# Patient Record
Sex: MALE | Race: WHITE
[De-identification: names, ages, dates, MRNs, and addresses within clinical notes are randomized per-mention and may not be internally consistent; named-entity substitution may affect disease eponyms.]

---

## 2017-04-26 ENCOUNTER — HOSPITAL ENCOUNTER (OUTPATIENT)
Dept: HOSPITAL 43 - DL.SDS | Age: 82
Discharge: HOME | End: 2017-04-26
Attending: OPHTHALMOLOGY
Payer: MEDICARE

## 2017-04-26 VITALS — DIASTOLIC BLOOD PRESSURE: 75 MMHG | SYSTOLIC BLOOD PRESSURE: 131 MMHG

## 2017-04-26 DIAGNOSIS — Z88.8: ICD-10-CM

## 2017-04-26 DIAGNOSIS — Z98.890: ICD-10-CM

## 2017-04-26 DIAGNOSIS — H26.9: Primary | ICD-10-CM

## 2017-04-26 DIAGNOSIS — Z96.641: ICD-10-CM

## 2017-04-26 DIAGNOSIS — Z79.899: ICD-10-CM

## 2017-04-26 DIAGNOSIS — Z87.891: ICD-10-CM

## 2017-04-26 PROCEDURE — 66984 XCAPSL CTRC RMVL W/O ECP: CPT

## 2017-04-26 PROCEDURE — C1780 LENS, INTRAOCULAR (NEW TECH): HCPCS

## 2017-04-26 NOTE — OR
DATE:  04/26/2017

 

PREOPERATIVE DIAGNOSES:  Complex cataract, left eye.  Small pupil, Malyugin

ring.

 

PREOPERATIVE DIAGNOSES:  Complex cataract, left eye.  Small pupil, Malyugin

ring.

 

INDICATION:  Mr. Norman was seen in the clinic with complaints of blurred vision.

Clinical examination revealed visually significant cataract.  He has difficulty

reading and difficulty with bright lights and glare.  I explained options.  I

offered cataract surgery, and I explained risks preoperatively including the

potential for infection, retinal detachment, loss of vision, need for additional

surgery, and risks associated with anesthesia.  He is symptomatic and requested

surgery.  He requested a monofocal implant.

 

OPERATIVE DESCRIPTION:  After informed consent was obtained.  The risks,

benefits, and alternatives were explained, the patient was brought to the OR and

topical anesthesia was administered.  He was prepped and draped in a sterile

fashion.  Attention was placed on the left eye.  A sterile lid speculum was

placed into the left eye to allow operative exposure.  A full-thickness

paracentesis was then made temporally.  Preservative-free lidocaine followed by

viscoelastic was injected into the anterior chamber.  A 2.75 mm incision was

then made temporally.  Pupil was noted to be small, approximately 4 mm.

Malyugin ring was inserted to expand the pupil to approximately 6 mm.  Bent

needle cystotome was then used to create a small nick in the anterior capsule.

A 360 degree curvilinear capsulorrhexis was created.  Nucleus was

hydrodissected, hydrodelineated, decompressed centrally, rotated and noted to be

free of any adhesions.  The nucleus was then removed using a quick chop

technique at the iris plane.  Following the nucleus removal, the remaining

cortical material was removed using the I and A handpiece.  Additional

viscoelastic was injected into the capsular bag.  The intra-ocular lens was

inserted.  The Malyugin ring was then removed.  The remaining viscoelastic was

then aspirated from both the anterior and posterior chamber.  Wound and

paracentesis sites were hydrated using balanced saline solution.  A 0.1 mL of

preservative-free vancomycin was injected intracamerally.  Intra-ocular lens was

inspected and noted to be clear and well-centered.  Good red reflex was noted.

Intra-ocular pressure was assessed and found to be in the high normal range.

Wound and paracentesis sites were Magen negative.  Postoperative medications

were administered.  Sterile patch and shield was placed over the eye.  Patient

was awakened from light sedation and transported to the postoperative recovery

area having tolerated the procedure well.  No complications occurred.

 

DD:  04/26/2017 08:38:54

DT:  04/26/2017 10:28:41

[facility]

Job #:  [id number]

## 2017-05-01 ENCOUNTER — HOSPITAL ENCOUNTER (EMERGENCY)
Dept: HOSPITAL 43 - DL.ED | Age: 82
Discharge: HOME | End: 2017-05-01
Payer: MEDICARE

## 2017-05-01 VITALS — SYSTOLIC BLOOD PRESSURE: 129 MMHG | DIASTOLIC BLOOD PRESSURE: 88 MMHG

## 2017-05-01 DIAGNOSIS — E78.00: ICD-10-CM

## 2017-05-01 DIAGNOSIS — Z88.8: ICD-10-CM
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
## 2017-05-01 DIAGNOSIS — Z88.5: ICD-10-CM
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
## 2017-05-01 DIAGNOSIS — S70.01XA: Primary | ICD-10-CM

## 2017-05-01 DIAGNOSIS — F32.9: ICD-10-CM

## 2017-05-01 DIAGNOSIS — Z87.440: ICD-10-CM

## 2017-05-01 DIAGNOSIS — Z87.891: ICD-10-CM

## 2017-05-01 DIAGNOSIS — Z96.641: ICD-10-CM

## 2017-05-01 NOTE — CR
CLINICAL HISTORY: 81-year-old male with history prostate cancer (radiation seeds) and right hip pain
 after fall

 

INTERPRETATION: AP pelvis/frog lateral hips and AP/frog lateral right hip films document multilevel 
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
is; and total orthopedic prosthesis right hip. Femoral and acetabular components of the total prosth
esis satisfactorily "seated".

 

No sign of acute pelvic or either hip fracture/dislocation.

## 2017-05-01 NOTE — EDM.PDOC
ED HPI Trauma





- General


Chief Complaint: Lower Extremity Injury/Pain


Stated Complaint: [id number] FELL ON CEMENT BROKE HIP?


Time Seen by Provider: 17 09:38


Source: Reports: Patient, Old records, RN, RN notes reviewed


History Limitations: Reports: No limitations





- History of Present Illness


INITIAL COMMENTS - FREE TEXT/NARRATIVE: 


Arrives from home by POV with c/o right hip pain sustained last night from a 

ground level fall. Pt tripped and lost his balance. He denies lightheadedness 

or syncope. Denies any other injury. Pt ambulated into the hospital/ED dept. 

but reports hip pain is worse with wt bearing. Pain is partially relieved with 

rest. Rates pain 6/10. Pt took a hydrocodone this morning and got some relief. 

Denies head injury, or any other injury.





Symptom Onset Date: 17


Occurred When: yesterday


Occurred Where: home


Method of Injury: fall


Severity: moderate


Pain/Injury Location: Reports: lower extremity, right


Consciousness: Reports: no loss of consciousness, remembers incident


Associated Symptoms: Reports: no other symptoms


Allergies/ADRs: 


Allergies





lorazepam [From Ativan] Allergy (Verified 17 07:16)


 Confusion


morphine Allergy (Verified 17 07:16)


 Confusion


oxybutynin Allergy (Verified 17 07:16)


 Cannot Remember








Home Medications: 


Ambulatory Orders





Omeprazole [Prilosec] 20 mg PO DAILY 14 [Confirmed 17]


Simvastatin [Zocor] 40 mg PO BEDTIME 14 [Confirmed 17]


Tamsulosin [Flomax] 0.4 mg PO DAILY 14 [Confirmed 17]


Bicalutamide [Casodex] 1 tab PO DAILY 16 [Confirmed 17]


Docusate Sodium/Sennosides [Senna Plus] 2 tab PO BEDTIME #30 tablet 16 [

Confirmed 17]


Acetaminophen/HYDROcodone [Norco 325-10 MG] 1 tab PO Q6H PRN 17 [

Confirmed 17]


Escitalopram Oxalate [Lexapro] 1 tab PO DAILY 17 [Confirmed 17]


Pred-Moxi-Ketor [Cataract Opthalmic Solution] 1 drop EYELF QID 17 [

Confirmed 17]


Ciprofloxacin HCl [Cipro] 1 tab PO BID 17 [Confirmed 17]











Past Medical History


HEENT History: Reports: Cataract, Impaired vision, Other (see below)


Other HEENT History: wears glasses


Cardiovascular History: Reports: High cholesterol


Respiratory History: Reports: None


Gastrointestinal History: Reports: Chronic constipation, GERD


Genitourinary History: Reports: BPH, UTI, recurrent, Other (see below)


Other Genitourinary History: dilalation of urethra


Musculoskeletal History: Reports: Back pain, chronic, Fracture


Other Musculoskeletal History: left femur surgery


Neurological History: Reports: None


Psychiatric History: Reports: Depression


Endocrine/Metabolic History: Reports: None


Hematologic History: Reports: None


Immunologic History: Reports: None


Oncologic (Cancer) History: Reports: Prostate


Dermatologic History: Reports: None





- Infectious Disease History


Infectious Disease History: Reports: Chicken pox, Measles, Mumps, Other (see 

below)





- Past Surgical History


Head Surgeries/Procedures: Reports: None


HEENT Surgical History: Reports: None


Cardiovascular Surgical History: Reports: None


GI Surgical History: Reports: Colonoscopy


Other Male  Surgeries/Procedures: prostate problems


Musculoskeletal Surgical History: Reports: Other (see below)


Other Musculoskeletal Surgeries/Procedures:: back surg, R) hip joint replacement





Social & Family History





- Family History


Family Medical History: Noncontributory


Other Hematologic Family History: mother  of cerebral aneurysm





- Tobacco Use


Smoking Status *Q: Former Smoker


Years of Tobacco use: 20


Packs/Tins Daily: 0.5


Used Tobacco, but Quit: Yes


Month Tobacco Last Used: when he was 41 years old


Second Hand Smoke Exposure: No





- Caffeine Use


Caffeine Use: Reports: Coffee, Soda





- Alcohol Use


Days Per Week of Alcohol Use: 0





- Recreational Drug Use


Recreational Drug Use: No





- Living Situation & Occupation


Occupation: retired





Review of Systems





- Review of Systems


Review Of Systems: ROS reveals no pertinent complaints other than HPI.





Trauma Exam





- Physical Exam


Exam: See Below


Exam Limited By: No limitations


General Appearance: Reports: alert, WD/WN, no apparent distress


Head: Reports: atraumatic, normocephalic


Ears: Reports: normal external exam, hearing grossly normal


Nose: Reports: normal inspection


Throat/Mouth: Reports: Normal voice, No airway compromise


Neck: Reports: non-tender, full range of motion, normal alignment, normal 

inspection


Respiratory Exam: Reports: no respiratory distress, lungs clear, normal breath 

sounds, no accessory muscle use, chest non-tender


Cardiovascular: Reports: regular rate, rhythm


GI/Abdominal: Reports: normal bowel sounds, soft, non tender, no distention


 (Male) Exam: Deferred


Rectal (Males) Exam: Deferred


Back: Reports: decreased range of motion (chronic/stable per pt.).  Denies: CVA 

tenderness (R), CVA tenderness (L), paraspinal tenderness, vertebral tenderness


Extremities: Reports: pelvis stable, pain with movement, tenderness (to 

palpation at Rt lateral hip region, no visible bruising or swelling; skin is 

intact.), other (able to bear wt).  Denies: bony-point tenderness, pedal edema


Neurologic: Reports: CNs II-XII nml as tested, no motor/sensory deficits, alert

, normal mood/affect, oriented x 3


Skin: Reports: Normal color, Warm/dry





- [place] Coma Score


Best Eye Response ([place]): (4) open spontaneously


Best Verbal Response ([place]): (5) oriented


Best Motor Response ([place]): (6) obeys commands


[place] Total: 15





Course





- Vital Signs


Last Recorded V/S: 


 Last Vital Signs











Temp  36.1 C   17 09:46


 


Pulse  76   17 09:46


 


Resp  16   17 09:46


 


BP  129/88   17 09:46


 


Pulse Ox  96   17 09:46














- Orders/Labs/Meds


Orders: 


 Active Orders 24 hr











 Category Date Time Status


 


 Hip Min 2V or 3V w Pelvis Rt [CR] Stat Exams  17 09:48 Ordered














- Radiology Interpretation


Free Text/Narrative:: 


Xray Rt hip & pelvis: no acute fractures, prosthetic Rt hip; see Rad. report.





Departure





- Departure


Time of Disposition: 10:03


Disposition: Home, Self-Care 01


Condition: good


Clinical Impression: 


 Fall as cause of accidental injury at home as place of occurrence





Contusion of right hip


Qualifiers:


 Encounter type: initial encounter Qualified Code(s): S70.01XA - Contusion of 

right hip, initial encounter





Instructions:  Hip Pointer, Easy-to-Read, Hip Pain


Forms:  ED Department Discharge


Additional Instructions: 


Activity as tolerated.


Rest, and use ice packs to area of right hip pain.


Follow up in clinic with your doctor if not improving in 7 to 10 days.





- My Orders


Last 24 Hours: 


My Active Orders





17 09:48


Hip Min 2V or 3V w Pelvis Rt [CR] Stat 














- Assessment/Plan


Last 24 Hours: 


My Active Orders





17 09:48


Hip Min 2V or 3V w Pelvis Rt [CR] Stat

## 2017-05-17 ENCOUNTER — HOSPITAL ENCOUNTER (OUTPATIENT)
Dept: HOSPITAL 43 - DL.SDS | Age: 82
Discharge: HOME | End: 2017-05-17
Attending: OPHTHALMOLOGY
Payer: MEDICARE

## 2017-05-17 VITALS — DIASTOLIC BLOOD PRESSURE: 82 MMHG | SYSTOLIC BLOOD PRESSURE: 178 MMHG

## 2017-05-17 DIAGNOSIS — H26.9: Primary | ICD-10-CM

## 2017-05-17 DIAGNOSIS — Z98.890: ICD-10-CM

## 2017-05-17 DIAGNOSIS — Z88.8: ICD-10-CM

## 2017-05-17 DIAGNOSIS — Z87.891: ICD-10-CM

## 2017-05-17 DIAGNOSIS — Z96.641: ICD-10-CM

## 2017-05-17 PROCEDURE — C1780 LENS, INTRAOCULAR (NEW TECH): HCPCS

## 2017-05-17 PROCEDURE — 66984 XCAPSL CTRC RMVL W/O ECP: CPT

## 2017-05-17 PROCEDURE — 00142 ANES PX ON EYE LENS SURGERY: CPT

## 2017-05-17 NOTE — OR
DATE:  05/17/2017

 

PREOPERATIVE DIAGNOSIS:  Cataract, right eye.

 

POSTOPERATIVE DIAGNOSIS:  Cataract, right eye.

 

PROCEDURE:  Extracapsular cataract extraction with intraocular lens implant,

right eye.

 

ANESTHESIA:  Topical/local MAC.

 

COMPLICATIONS:  None.

 

INDICATION:  Mr. Norman was seen in the clinic.  He has complained of difficulty

reading, difficulty with fine with bright lights, difficulty with glare.

Clinical examination reveals visually significant mixed cataract.  I explained

options.  I offered cataract surgery and I explained risks including but not

limited to, infection, retinal detachment, loss of vision, need for additional

surgery, and risks associated with anesthesia.  We discussed implant options.

He requested a monofocal implant.

 

OPERATIVE DESCRIPTION:  After informed consent was obtained and the risks,

benefits, and alternatives were explained, the patient was brought to the

operative suite and topical anesthesia was administered.  The patient was then

prepped and draped in the sterile fashion and attention was placed on the right

eye.  A sterile lid speculum was placed into the right eye to allow operative

exposure. A full-thickness paracentesis was made in the temporal portion of the

operative eye. Preservative-free lidocaine 0.1 mL was injected into the anterior

chamber followed by viscoelastic. A full-thickness corneal incision was then

made into the anterior chamber.  A bent needle cystotome was used to create a

small nick in the anterior capsule. The capsulorrhexis forceps was then used to

create a 360-degree curvilinear capsulorrhexis.  The nucleus was then removed

using a phacoemulsification handpiece and the remaining cortical material was

then removed with irrigation and aspiration handpiece. Following removal of the

cortical material, the capsular bag was then inspected and noted to be free of

any holes or tears. Viscoelastic was then injected into the capsular bag and the

intraocular lens was inserted into the capsular bag.  No complications occurred.

The viscoelastic material was then removed from both the anterior and posterior

chambers and from behind the IOL. The lens and capsular bag were then

reinspected. The IOL was well centered and the capsular bag intact. The wound

and paracentesis sites were inspected and hydrated with balanced saline

solution. Both were found to be self-sealing. The intraocular pressure was

assessed digitally and found to be within normal range. A good red reflex was

noted at the completion of the procedure. No complications occurred during the

operation. At the completion of the procedure, Maxitrol, Voltaren, and Iopidine

drops were placed into the operative eye. A sterile eye shield was placed over

the operative eye and the patient was transported to the postoperative recovery

area having tolerated the procedure well. Postoperative instructions were given

along with a postoperative appointment.  The patient was advised to call with

any questions or concerns.

 

DD:  05/17/2017 10:07:25

DT:  05/17/2017 11:13:44

[facility]

Job #:  [id number]

## 2017-05-18 NOTE — OR
DATE:  05/17/2017

 

PREOPERATIVE DIAGNOSIS:  Visually significant cataract, right eye.

 

POSTOPERATIVE DIAGNOSIS:  Visually significant cataract, right eye.

 

PROCEDURE:  Complex cataract right eye with small pupil, Malyugin ring.

 

INDICATION:  Mr. Norman was seen in the clinic.  Clinical examination revealed

visually significant cataract.  I explained options.  I offered cataract

surgery, and I explained risks preoperatively including but not limited to,

infection, retinal detachment, loss of vision, need for additional surgery, and

risks associated with anesthesia.  We discussed implant options.  He requested a

monofocal implant.

 

OPERATIVE DESCRIPTION:  After informed consent was obtained.  The risks,

benefits, and alternatives were explained, the patient was brought to the OR and

topical anesthesia was administered.  He was prepped and draped in a sterile

fashion.  Attention was placed on the right eye.  A sterile lid speculum was

placed into the right eye to allow operative exposure.  A full-thickness

paracentesis was then made temporally.  Preservative-free lidocaine followed by

viscoelastic was injected into the anterior chamber.  A 2.75 mm corneal incision

was then made temporally.  The pupil was noted to be small, Malyugin ring was

inserted to expand the pupil to approximately 5.5 mm.  Bent needle cystotome was

then used to create a small nick in the anterior capsule.  A 5 mm curvilinear

capsulorrhexis was created.  Nucleus was then hydrodissected and

hydrodelineated.  Nucleus was decompressed centrally and rotated, noted to be

free of any adhesions.  Nucleus was then removed using the phacoemulsification

handpiece in a quick chop technique.  Remaining cortical material was removed

using the irrigation, aspiration handpiece.  Additional viscoelastic was

injected into the capsular bag.  The intraocular lens was inserted.  The

Malyugin ring was then removed.  The remaining viscoelastic was then aspirated

from both the anterior and posterior chamber.  Wound and paracentesis sites were

hydrated.  A 0.1 mL of preservative-free vancomycin was injected intracamerally.

Intra-ocular lens was inspected and noted to be clear and well-centered.  Good

red reflex was noted.  Wound and paracentesis sites were Magen negative.  Intra-

ocular pressure was assessed digitally and found to be in the high normal range.

Postoperative medications were administered.  Sterile patch and shield was

placed over the eye.  The patient was awakened from light sedation and

transported to the postoperative recovery area having tolerated the procedure

well.  No complications occurred.

 

DD:  05/17/2017 10:34:41

DT:  05/17/2017 15:19:47

[facility]

Job #:  [id number]

## 2017-07-25 ENCOUNTER — HOSPITAL ENCOUNTER (EMERGENCY)
Dept: HOSPITAL 43 - DL.ED | Age: 82
Discharge: HOME | End: 2017-07-25
Payer: MEDICARE

## 2017-07-25 VITALS — SYSTOLIC BLOOD PRESSURE: 119 MMHG | DIASTOLIC BLOOD PRESSURE: 72 MMHG

## 2017-07-25 DIAGNOSIS — K21.9: ICD-10-CM

## 2017-07-25 DIAGNOSIS — Z98.49: ICD-10-CM

## 2017-07-25 DIAGNOSIS — N30.00: Primary | ICD-10-CM

## 2017-07-25 DIAGNOSIS — H54.7: ICD-10-CM

## 2017-07-25 DIAGNOSIS — Z88.8: ICD-10-CM

## 2017-07-25 DIAGNOSIS — F32.9: ICD-10-CM

## 2017-07-25 DIAGNOSIS — Z87.440: ICD-10-CM

## 2017-07-25 DIAGNOSIS — Z88.5: ICD-10-CM

## 2017-07-25 DIAGNOSIS — E78.00: ICD-10-CM

## 2017-07-25 DIAGNOSIS — Z79.899: ICD-10-CM

## 2017-07-25 NOTE — EDM.PDOC
ED HPI GENERAL MEDICAL PROBLEM





- General


Chief Complaint: General


Stated Complaint: CAN'T PEE OR ANYTHING


Time Seen by Provider: 17 11:35


Source of Information: Reports: Patient


History Limitations: Reports: No Limitations





- History of Present Illness


INITIAL COMMENTS - FREE TEXT/NARRATIVE: 





This 80 yo male patient reports to the ED with continued and increased pelvic 

pain. The patient reports his pain got much worse today. The patient has been 

seen by several providers for similar symptoms. The patient is seeing Dr. Mandel, Gilda Toribio, NP and a urologist for these symptoms in the past. The 

patient reports he has not been able to urinate in the past hour. The patient 

reports some frustration with his continuing symptoms. The patient reports he 

started on Oxycontin last week and was started on Augmentin yesterday due to 

urinalysis results.


Onset: Today


Onset Date: 17


Onset Time: 06:00


Duration: Hour(s):, Constant


Location: Reports: Abdomen, Pelvis


Quality: Reports: Ache, Sharp


Severity: Severe


Improves with: Reports: None


Worsens with: Reports: None


Associated Symptoms: Reports: No Other Symptoms


  ** Left Pelvic


Pain Score (Numeric/FACES): 5





- Related Data


 Allergies











Allergy/AdvReac Type Severity Reaction Status Date / Time


 


lorazepam [From Ativan] Allergy  Confusion Verified 17 09:06


 


morphine Allergy  Confusion Verified 17 09:06


 


oxybutynin Allergy  Cannot Verified 17 09:06





   Remember  











Home Meds: 


 Home Meds





Omeprazole [Prilosec] 20 mg PO DAILY 14 [History]


Simvastatin [Zocor] 40 mg PO BEDTIME 14 [History]


Tamsulosin [Flomax] 0.4 mg PO BEDTIME 14 [History]


Bicalutamide [Casodex] 1 tab PO DAILY 16 [History]


Docusate Sodium/Sennosides [Senna Plus] 2 tab PO BEDTIME #30 tablet 16 [Rx

]


Escitalopram Oxalate [Lexapro] 1 tab PO DAILY 17 [History]


Pred-Moxi-Ketor [Cataract Opthalmic Solution] 1 drop EYELF QID 17 [History

]


Hydrocodone/Acetaminophen [Norco 5-325 Tablet] 1 each PO Q6HR PRN 17 [

History]


Sulfamethoxazole/Trimethoprim [Sulfamethoxazole-Tmp Ds Tablet] 1 tab PO BID  [History]











Past Medical History


HEENT History: Reports: Cataract, Impaired Vision, Other (See Below)


Other HEENT History: wears glasses


Cardiovascular History: Reports: High Cholesterol


Respiratory History: Reports: None


Gastrointestinal History: Reports: Chronic Constipation, GERD


Genitourinary History: Reports: BPH, UTI, Recurrent, Other (See Below)


Other Genitourinary History: dilalation of urethra


Musculoskeletal History: Reports: Back Pain, Chronic, Fracture


Other Musculoskeletal History: left femur surgery


Neurological History: Reports: None


Psychiatric History: Reports: Depression


Endocrine/Metabolic History: Reports: None


Hematologic History: Reports: None


Immunologic History: Reports: None


Oncologic (Cancer) History: Reports: Prostate


Dermatologic History: Reports: None





- Infectious Disease History


Infectious Disease History: Reports: Chicken Pox, Measles, Mumps, Other (See 

Below)





- Past Surgical History


Head Surgeries/Procedures: Reports: None


HEENT Surgical History: Reports: None, Cataract Surgery


Cardiovascular Surgical History: Reports: None


GI Surgical History: Reports: Colonoscopy


Other Male  Surgeries/Procedures: prostate problems


Musculoskeletal Surgical History: Reports: Other (See Below)


Other Musculoskeletal Surgeries/Procedures:: back surg, R) hip joint replacement





Social & Family History





- Family History


Family Medical History: Noncontributory


Other Hematologic Family History: mother  of cerebral aneurysm





- Tobacco Use


Smoking Status *Q: Never Smoker


Years of Tobacco use: 20


Packs/Tins Daily: 0.5


Used Tobacco, but Quit: Yes


Month Tobacco Last Used: when he was 41 years old


Second Hand Smoke Exposure: No





- Caffeine Use


Caffeine Use: Reports: Coffee, Tea





- Alcohol Use


Days Per Week of Alcohol Use: 0





- Recreational Drug Use


Recreational Drug Use: No





- Living Situation & Occupation


Occupation: Retired





ED ROS GENERAL





- Review of Systems


Review Of Systems: ROS reveals no pertinent complaints other than HPI.





ED EXAM, GENERAL





- Physical Exam


Exam: See Below


Exam Limited By: No Limitations


General Appearance: Alert, WD/WN, No Apparent Distress


Eye Exam: Bilateral Eye: EOMI, Normal Inspection, PERRL


Ears: Normal External Exam, Normal Canal, Hearing Grossly Normal, Normal TMs


Nose: Normal Inspection, Normal Mucosa, No Blood


Throat/Mouth: Normal Inspection, Normal Lips, Normal Teeth, Normal Gums, Normal 

Oropharynx, Normal Voice, No Airway Compromise


Head: Atraumatic, Normocephalic


Neck: Normal Inspection, Supple, Non-Tender, Full Range of Motion


Respiratory/Chest: No Respiratory Distress, Lungs Clear, Normal Breath Sounds, 

No Accessory Muscle Use, Chest Non-Tender


Cardiovascular: Normal Peripheral Pulses, Regular Rate, Rhythm, No Edema, No 

Gallop, No JVD, No Murmur, No Rub


GI/Abdominal: Normal Bowel Sounds, Tender (lower abdomen)


 (Male) Exam: Deferred


Rectal (Males) Exam: Deferred


Back Exam: Normal Inspection, Full Range of Motion, NT


Extremities: Other (the patient has diffuse pelvic pain)


Neurological: Alert, Oriented, CN II-XII Intact, Abnormal Gait


Psychiatric: Anxious, Depressed Mood


Skin Exam: Warm, Dry, Intact, Normal Color, No Rash


Lymphatic: No Adenopathy





Course





- Vital Signs


Last Recorded V/S: 


 Last Vital Signs











Temp  36.4 C   17 11:26


 


Pulse  88   17 13:02


 


Resp  18   17 13:02


 


BP  119/72   17 13:02


 


Pulse Ox  98   17 13:02














Departure





- Departure


Time of Disposition: 13:25


Disposition: Home, Self-Care 01


Condition: Fair


Clinical Impression: 


UTI (urinary tract infection)


Qualifiers:


 Urinary tract infection type: acute cystitis Hematuria presence: without 

hematuria Qualified Code(s): N30.00 - Acute cystitis without hematuria








- Discharge Information


Instructions:  Urinary Tract Infection, Adult


Forms:  ED Department Discharge


Care Plan Goals: 


The patient and family were advised of the ultrasound results during the visit. 

The patient was encouraged to continue to take his Amoxicillin as prescribed. 

The patient has a follow-up appointment with Gilda Toribio on Thursday for continued 

evaluation and management. If the patient has any additional symptoms or 

concerns, the patient should either visit his primary care facility or return 

to the emergency department.

## 2017-07-25 NOTE — US
Clinical history: 81-year-old male with history of known prostate cancer (radiation seeds); complain
ing of persistent lower abdominal and suprapubic pain; who "hasn't peed for one hour". Started antib
iotic therapy yesterday for a "urinary tract infection". Recent radionuclide bone scan and MRI of th
e pelvis reportedly "unremarkable". Urinary retention?

 

Interpretation:

 

Mild uniformly thickened bladder wall.

 

Symmetrically distended, small volume, urinary bladder without sign mucosal wall mass or dependent i
ntraluminal echogenic "shadowing" urinary bladder stone. Small 12 mm diameter diverticulum base of t
he urinary bladder laterally, on the right.

 

Normal ureteral "jets" identified bilaterally.

## 2017-07-31 ENCOUNTER — HOSPITAL ENCOUNTER (INPATIENT)
Dept: HOSPITAL 43 - DL.ED | Age: 82
LOS: 3 days | Discharge: NURSING, REHAB OR LONG-TERM CARE | DRG: 543 | End: 2017-08-03
Attending: INTERNAL MEDICINE | Admitting: INTERNAL MEDICINE
Payer: MEDICARE

## 2017-07-31 DIAGNOSIS — E78.00: ICD-10-CM

## 2017-07-31 DIAGNOSIS — N30.00: ICD-10-CM

## 2017-07-31 DIAGNOSIS — F32.9: ICD-10-CM

## 2017-07-31 DIAGNOSIS — Z88.8: ICD-10-CM

## 2017-07-31 DIAGNOSIS — M84.454A: Primary | ICD-10-CM

## 2017-07-31 DIAGNOSIS — F03.90: ICD-10-CM

## 2017-07-31 DIAGNOSIS — R10.2: ICD-10-CM

## 2017-07-31 DIAGNOSIS — Z87.891: ICD-10-CM

## 2017-07-31 DIAGNOSIS — F05: ICD-10-CM

## 2017-07-31 DIAGNOSIS — R33.9: ICD-10-CM

## 2017-07-31 DIAGNOSIS — C61: ICD-10-CM

## 2017-07-31 DIAGNOSIS — Z79.899: ICD-10-CM

## 2017-07-31 DIAGNOSIS — F41.8: ICD-10-CM

## 2017-07-31 LAB
CHLORIDE SERPL-SCNC: 97 MMOL/L (ref 101–111)
SODIUM SERPL-SCNC: 135 MMOL/L (ref 135–145)

## 2017-07-31 PROCEDURE — 36415 COLL VENOUS BLD VENIPUNCTURE: CPT

## 2017-07-31 PROCEDURE — 87086 URINE CULTURE/COLONY COUNT: CPT

## 2017-07-31 PROCEDURE — 83605 ASSAY OF LACTIC ACID: CPT

## 2017-07-31 PROCEDURE — S0166 INJ OLANZAPINE 2.5MG: HCPCS

## 2017-07-31 PROCEDURE — 80053 COMPREHEN METABOLIC PANEL: CPT

## 2017-07-31 PROCEDURE — 87040 BLOOD CULTURE FOR BACTERIA: CPT

## 2017-07-31 PROCEDURE — 83735 ASSAY OF MAGNESIUM: CPT

## 2017-07-31 PROCEDURE — 81001 URINALYSIS AUTO W/SCOPE: CPT

## 2017-07-31 PROCEDURE — 85025 COMPLETE CBC W/AUTO DIFF WBC: CPT

## 2017-07-31 RX ADMIN — Medication PRN ML: at 22:25

## 2017-07-31 RX ADMIN — Medication SCH EACH: at 22:30

## 2017-07-31 RX ADMIN — OXYCODONE HYDROCHLORIDE AND ACETAMINOPHEN PRN TAB: 5; 325 TABLET ORAL at 20:43

## 2017-07-31 RX ADMIN — ATROPA BELLADONNA AND OPIUM SCH: 16.2; 6 SUPPOSITORY RECTAL at 20:52

## 2017-07-31 RX ADMIN — Medication PRN ML: at 21:01

## 2017-07-31 RX ADMIN — CIPROFLOXACIN SCH MLS/HR: 2 INJECTION, SOLUTION INTRAVENOUS at 21:01

## 2017-07-31 RX ADMIN — OXYCODONE HYDROCHLORIDE AND ACETAMINOPHEN PRN TAB: 5; 325 TABLET ORAL at 16:38

## 2017-07-31 RX ADMIN — Medication SCH: at 20:53

## 2017-07-31 RX ADMIN — HEPARIN SODIUM SCH UNITS: 5000 INJECTION, SOLUTION INTRAVENOUS; SUBCUTANEOUS at 22:29

## 2017-07-31 RX ADMIN — OXYCODONE HYDROCHLORIDE SCH MG: 20 TABLET, FILM COATED, EXTENDED RELEASE ORAL at 20:43

## 2017-07-31 NOTE — EDM.PDOC
ED HPI GENERAL MEDICAL PROBLEM





- General


Chief Complaint: Abdominal Pain


Stated Complaint: CONFUSSED, DEHYDRATION, CAN HARDLY WALK


Time Seen by Provider: 17 10:45


Source of Information: Reports: Patient, Family


History Limitations: Reports: No Limitations





- History of Present Illness


INITIAL COMMENTS - FREE TEXT/NARRATIVE: 





This 82 yo male patient reports to the ED with continued pelvic pain. The 

patient reports he continues to have pain despite his recent changes in 

medications and treatments. The patient has been too weak to take care of 

himself. The patient reports he has pain in the posterior pelvic that wraps 

around to the front of his pelvis. The patient's family reports the patient has 

refused to take any of his medications other than his pain medications. The 

patient is currently being seen by Gilda Toribio ([facility] in [place]) , 

Dr. Hester ([facility] in [place]) and Dr. Crum ([facility] in [place]). 

The patient had a bone scan done 1 1/2 weeks ago which was read as normal by 

radiologist, but was read as abnormal by Dr. Crum. The patient has been seen 

numerous times in both the clinic and ED over the past 2 weeks. At this time, 

the patient's family reports that they do not think the patient can take care 

of himself due to both pain as well as with his progressive dementia. The 

family reports the patient is very confused (especially in the evenings). The 

patient's family reports that the patient was attempting to make phone calls 

with the TV remote and change the channel with the phone. The memory issues 

have not gotten any worse with the recent increase in pain medications. The 

patient and family are supposed to be looking at an assisted care center today, 

but the family does not think this will be enough care with his current 

condition.  


Onset: Gradual


Duration: Week(s):, Constant, Getting Worse


Location: Reports: Pelvis


Quality: Reports: Ache, Sharp


Severity: Severe


Improves with: Reports: Medication (pain medication)


Worsens with: Reports: Movement


Context: Reports: Other


Associated Symptoms: Reports: Confusion (especially in the evenings), Weakness


Treatments PTA: Reports: Other Medication(s)


  ** Rectal


Pain Score (Numeric/FACES): 5





- Related Data


 Allergies











Allergy/AdvReac Type Severity Reaction Status Date / Time


 


lorazepam [From Ativan] Allergy  Confusion Verified 17 09:06


 


morphine Allergy  Confusion Verified 17 09:06


 


oxybutynin Allergy  Cannot Verified 17 09:06





   Remember  











Home Meds: 


 Home Meds





Omeprazole [Prilosec] 20 mg PO DAILY 14 [History]


Tamsulosin [Flomax] 0.4 mg PO BEDTIME 14 [History]


Bicalutamide [Casodex] 1 tab PO DAILY 16 [History]


Docusate Sodium/Sennosides [Senna Plus] 2 tab PO BEDTIME #30 tablet 16 [Rx

]


Escitalopram Oxalate [Lexapro] 1 tab PO DAILY 17 [History]


Hydrocodone/Acetaminophen [Norco 5-325 Tablet] 1 each PO Q6HR PRN 17 [

History]


oxyCODONE HCl [Oxycontin] 1 tab PO BID 17 [History]











Past Medical History


HEENT History: Reports: Cataract, Impaired Vision, Other (See Below)


Other HEENT History: wears glasses


Cardiovascular History: Reports: High Cholesterol


Respiratory History: Reports: None


Gastrointestinal History: Reports: Chronic Constipation, GERD


Genitourinary History: Reports: BPH, UTI, Recurrent, Other (See Below)


Other Genitourinary History: dilalation of urethra


Musculoskeletal History: Reports: Back Pain, Chronic, Fracture


Other Musculoskeletal History: left femur surgery


Neurological History: Reports: None


Psychiatric History: Reports: Depression


Endocrine/Metabolic History: Reports: None


Hematologic History: Reports: None


Immunologic History: Reports: None


Oncologic (Cancer) History: Reports: Prostate


Dermatologic History: Reports: None





- Infectious Disease History


Infectious Disease History: Reports: Chicken Pox, Measles, Mumps, Other (See 

Below)





- Past Surgical History


Head Surgeries/Procedures: Reports: None


HEENT Surgical History: Reports: None, Cataract Surgery


Cardiovascular Surgical History: Reports: None


GI Surgical History: Reports: Colonoscopy


Other Male  Surgeries/Procedures: prostate problems


Musculoskeletal Surgical History: Reports: Other (See Below)


Other Musculoskeletal Surgeries/Procedures:: back surg, R) hip joint replacement





Social & Family History





- Family History


Family Medical History: Noncontributory


Other Hematologic Family History: mother  of cerebral aneurysm





- Tobacco Use


Smoking Status *Q: Never Smoker


Years of Tobacco use: 20


Packs/Tins Daily: 0.5


Used Tobacco, but Quit: Yes


Month Tobacco Last Used: when he was 41 years old


Second Hand Smoke Exposure: No





- Caffeine Use


Caffeine Use: Reports: Coffee, Tea





- Alcohol Use


Days Per Week of Alcohol Use: 0





- Recreational Drug Use


Recreational Drug Use: No





- Living Situation & Occupation


Occupation: Retired





ED ROS GENERAL





- Review of Systems


Review Of Systems: ROS reveals no pertinent complaints other than HPI.





ED EXAM, GENERAL





- Physical Exam


Exam: See Below


Exam Limited By: No Limitations


General Appearance: Alert, WD/WN, Moderate Distress, Thin


Eye Exam: Bilateral Eye: EOMI, Normal Inspection, PERRL


Ears: Normal External Exam, Normal Canal, Hearing Grossly Normal, Normal TMs


Nose: Normal Inspection, Normal Mucosa, No Blood


Throat/Mouth: Normal Inspection, Normal Lips, Normal Teeth, Normal Gums, Normal 

Oropharynx, Normal Voice, No Airway Compromise


Head: Atraumatic, Normocephalic


Respiratory/Chest: No Respiratory Distress, Lungs Clear, Normal Breath Sounds, 

No Accessory Muscle Use, Chest Non-Tender


Cardiovascular: Normal Peripheral Pulses, Regular Rate, Rhythm, No Edema, No 

Gallop, No JVD, No Murmur, No Rub


GI/Abdominal: Normal Bowel Sounds, Soft, Tender (lower abdomen and pelvis)


 (Male) Exam: Deferred


Rectal (Males) Exam: Deferred


Back Exam: Normal Inspection, Full Range of Motion, NT


Extremities: Normal Inspection, Normal Range of Motion, Non-Tender, Normal 

Capillary Refill, No Pedal Edema


Neurological: Alert, Oriented, CN II-XII Intact, Normal Cognition, Normal Gait, 

Normal Reflexes, No Motor/Sensory Deficits


Psychiatric: Normal Affect, Normal Mood


Skin Exam: Warm, Dry, Intact, Normal Color, No Rash


Lymphatic: No Adenopathy





Course





- Vital Signs


Last Recorded V/S: 


 Last Vital Signs











Temp  36.9 C   17 12:07


 


Pulse  84   17 12:07


 


Resp  18   17 12:07


 


BP  140/56 L  17 12:07


 


Pulse Ox  91 L  17 12:07














- Orders/Labs/Meds


Orders: 


 Active Orders 24 hr











 Category Date Time Status


 


 CULTURE URINE [RM] Stat Lab  17 12:08 Ordered











Labs: 


 Laboratory Tests











  17 Range/Units





  11:04 11:04 11:04 


 


WBC  11.2 H    (5.0-10.0)  10^3/uL


 


RBC  4.08 L    (4.6-6.2)  10^6/uL


 


Hgb  11.5 L    (14.0-18.0)  g/dL


 


Hct  35.3 L    (40.0-54.0)  %


 


MCV  86.5    ()  fL


 


MCH  28.2    (27.0-34.0)  pg


 


MCHC  32.6 L    (33.0-35.0)  g/dL


 


Plt Count  299    (150-450)  10^3/uL


 


Neut % (Auto)  82.8 H    (42.2-75.2)  %


 


Lymph % (Auto)  8.1 L    (20.5-50.1)  %


 


Mono % (Auto)  7.3    (2-8)  %


 


Eos % (Auto)  1.5    (1.0-3.0)  %


 


Baso % (Auto)  0.3    (0.0-1.0)  %


 


Sodium   135   (135-145)  mmol/L


 


Potassium   4.0   (3.6-5.0)  mmol/L


 


Chloride   97 L   (101-111)  mmol/L


 


Carbon Dioxide   25.0   (21.0-31.0)  mmol/L


 


Anion Gap   17.0   


 


BUN   15   (7-18)  mg/dL


 


Creatinine   0.9   (0.6-1.3)  mg/dL


 


Est Cr Clr Drug Dosing   65.46   mL/min


 


Estimated GFR (MDRD)   > 60   


 


BUN/Creatinine Ratio   16.66   


 


Glucose   152 H   ()  mg/dL


 


Lactic Acid    1.1  (0.5-2.2)  mmol/L


 


Calcium   8.9   (8.4-10.2)  mg/dl


 


Magnesium   1.8   (1.8-2.5)  mg/dL


 


Total Bilirubin   0.9   (0.2-1.0)  mg/dL


 


AST   20   (10-42)  IU/L


 


ALT   23   (10-60)  IU/L


 


Alkaline Phosphatase   72   ()  IU/L


 


Total Protein   7.1   (6.7-8.2)  g/dl


 


Albumin   2.6 L   (3.2-5.5)  g/dl


 


Globulin   4.5   


 


Albumin/Globulin Ratio   0.58   


 


Urine Color     (YELLOW)  


 


Urine Appearance     (CLEAR)  


 


Urine pH     (5.0-9.0)  


 


Ur Specific Gravity     (1.005-1.030)  


 


Urine Protein     (NEGATIVE)  


 


Urine Glucose (UA)     (NEGATIVE)  


 


Urine Ketones     (NEGATIVE)  


 


Urine Occult Blood     (NEGATIVE)  


 


Urine Nitrite     (NEGATIVE)  


 


Urine Bilirubin     (NEGATIVE)  


 


Urine Urobilinogen     (0.2-1.0)  mg/dL


 


Ur Leukocyte Esterase     (NEGATIVE)  


 


Urine RBC     /HPF


 


Urine WBC     (0-5/HPF)  /HPF


 


Ur Epithelial Cells     /HPF


 


Amorphous Sediment     (0/HPF)  /HPF


 


Urine Bacteria     (0-FEW/HPF)  /HPF


 


Urine Mucus     /LPF














  17 Range/Units





  11:07 


 


WBC   (5.0-10.0)  10^3/uL


 


RBC   (4.6-6.2)  10^6/uL


 


Hgb   (14.0-18.0)  g/dL


 


Hct   (40.0-54.0)  %


 


MCV   ()  fL


 


MCH   (27.0-34.0)  pg


 


MCHC   (33.0-35.0)  g/dL


 


Plt Count   (150-450)  10^3/uL


 


Neut % (Auto)   (42.2-75.2)  %


 


Lymph % (Auto)   (20.5-50.1)  %


 


Mono % (Auto)   (2-8)  %


 


Eos % (Auto)   (1.0-3.0)  %


 


Baso % (Auto)   (0.0-1.0)  %


 


Sodium   (135-145)  mmol/L


 


Potassium   (3.6-5.0)  mmol/L


 


Chloride   (101-111)  mmol/L


 


Carbon Dioxide   (21.0-31.0)  mmol/L


 


Anion Gap   


 


BUN   (7-18)  mg/dL


 


Creatinine   (0.6-1.3)  mg/dL


 


Est Cr Clr Drug Dosing   mL/min


 


Estimated GFR (MDRD)   


 


BUN/Creatinine Ratio   


 


Glucose   ()  mg/dL


 


Lactic Acid   (0.5-2.2)  mmol/L


 


Calcium   (8.4-10.2)  mg/dl


 


Magnesium   (1.8-2.5)  mg/dL


 


Total Bilirubin   (0.2-1.0)  mg/dL


 


AST   (10-42)  IU/L


 


ALT   (10-60)  IU/L


 


Alkaline Phosphatase   ()  IU/L


 


Total Protein   (6.7-8.2)  g/dl


 


Albumin   (3.2-5.5)  g/dl


 


Globulin   


 


Albumin/Globulin Ratio   


 


Urine Color  Dark yellow  (YELLOW)  


 


Urine Appearance  Cloudy  (CLEAR)  


 


Urine pH  7.0  (5.0-9.0)  


 


Ur Specific Gravity  1.020  (1.005-1.030)  


 


Urine Protein  100 H  (NEGATIVE)  


 


Urine Glucose (UA)  Negative  (NEGATIVE)  


 


Urine Ketones  15 H  (NEGATIVE)  


 


Urine Occult Blood  Large H  (NEGATIVE)  


 


Urine Nitrite  Negative  (NEGATIVE)  


 


Urine Bilirubin  Small H  (NEGATIVE)  


 


Urine Urobilinogen  1.0  (0.2-1.0)  mg/dL


 


Ur Leukocyte Esterase  Large H  (NEGATIVE)  


 


Urine RBC  5-10 H  /HPF


 


Urine WBC  >100 H  (0-5/HPF)  /HPF


 


Ur Epithelial Cells  Few  /HPF


 


Amorphous Sediment  See note  (0/HPF)  /HPF


 


Urine Bacteria  Rare  (0-FEW/HPF)  /HPF


 


Urine Mucus  Few H  /LPF











Meds: 


Medications














Discontinued Medications














Generic Name Dose Route Start Last Admin





  Trade Name Freq  PRN Reason Stop Dose Admin


 


Ciprofloxacin  500 mg  17 12:15  





  Ciprofloxacin Hcl  PO  17 12:16  





  ONETIME ONE   














Departure





- Departure


Time of Disposition: 12:22


Disposition: Admitted As Inpatient 67


Condition: Fair


Clinical Impression: 


 Pelvic pain in male





UTI (urinary tract infection)


Qualifiers:


 Urinary tract infection type: acute cystitis Hematuria presence: without 

hematuria Qualified Code(s): N30.00 - Acute cystitis without hematuria








- Discharge Information


Care Plan Goals: 


Discussed the examination, history and lab results with Dr. Rogers. Dr. Rogers 

accepted the patient for continued evaluation and management as an inpatient at 

[facility] in [place]. 





- My Orders


Last 24 Hours: 


My Active Orders





17 12:08


CULTURE URINE [RM] Stat 














- Assessment/Plan


Last 24 Hours: 


My Active Orders





17 12:08


CULTURE URINE [RM] Stat

## 2017-07-31 NOTE — PCM.HP
H&P History of Present Illness





- General


Date of Service: 17


Admit Problem/Dx: 


 Admission Diagnosis/Problem





Admission Diagnosis/Problem      UTI, Urinary tract infectious disease








Source of Information: Patient, Family, Provider





- History of Present Illness


Initial Comments - Free Text/Narative: 








The patient is a 81-year-old gentleman with a history of prostate cancer status 

post radiation, erectile dysfunction, urinary retention with episodic self-

catheterization. The patient has frequent recurrent urinary tract infection.


For the prostate cancer he is followed by Dr. HICKMAN in [place], rest of the 

urology appointments are  with Dr. Jensen in [place]. Last time he was 

seen at the end of May when he was treated for urinary tract infection with 

Cipro. 


The patient presented with pain in the anterior pelvis area. The pain is 

moderate to severe, worse with movements, present for at least 2 or 3 weeks 

prior to this presentation.


No associated fever. No apparent trauma.


Prior workup included an MRI of the pelvis () which showed changes in the 

pubic bone compatible with radiation injury. Prior pelvic ultrasound showed 

small volume urinary bladder without sign of mucosa wall mass.


He has been treated with amoxicillin for a urinary tract infection starting on 

.


He has been taking OxyContin and oxycodone without help.


He denies fever. No urinary burning.


Family has also noted increased confusion, disorientation. This is usually 

first by the evening.


The patient has been living independently but family felt that this is not 

adequate anymore and they were looking into moving into an assisted living 

facility.


Onset of Symptoms: Reports: Gradual (Over 2-3 weeks)


  ** Rectal


Pain Score (Numeric/FACES): 5





  ** Generalized


Pain Score (Numeric/FACES): 5





- Related Data


Allergies/Adverse Reactions: 


 Allergies











Allergy/AdvReac Type Severity Reaction Status Date / Time


 


lorazepam [From Ativan] Allergy  Confusion Verified 17 13:47


 


morphine Allergy  Confusion Verified 17 13:47


 


oxybutynin Allergy  Cannot Verified 17 13:47





   Remember  











Home Medications: 


 Home Meds





Omeprazole [Prilosec] 20 mg PO DAILY 14 [History]


Tamsulosin [Flomax] 0.4 mg PO BEDTIME 14 [History]


Bicalutamide [Casodex] 1 tab PO DAILY 16 [History]


Docusate Sodium/Sennosides [Senna Plus] 2 tab PO BEDTIME #30 tablet 16 [Rx

]


Escitalopram Oxalate [Lexapro] 1 tab PO DAILY 17 [History]


Hydrocodone/Acetaminophen [Norco 5-325 Tablet] 1 each PO Q6HR PRN 17 [

History]


oxyCODONE HCl [Oxycontin] 1 tab PO BID 17 [History]











Past Medical History


HEENT History: Reports: Cataract, Impaired Vision, Other (See Below)


Other HEENT History: wears glasses


Cardiovascular History: Reports: High Cholesterol


Respiratory History: Reports: None


Gastrointestinal History: Reports: Chronic Constipation, GERD


Genitourinary History: Reports: BPH, UTI, Recurrent, Other (See Below)


Other Genitourinary History: dilalation of urethra


Musculoskeletal History: Reports: Back Pain, Chronic, Fracture


Other Musculoskeletal History: left femur surgery


Neurological History: Reports: None


Psychiatric History: Reports: Depression


Endocrine/Metabolic History: Reports: None


Hematologic History: Reports: None


Immunologic History: Reports: None


Oncologic (Cancer) History: Reports: Prostate


Dermatologic History: Reports: None





- Infectious Disease History


Infectious Disease History: Reports: Chicken Pox, Measles, Mumps, Other (See 

Below)





- Past Surgical History


Head Surgeries/Procedures: Reports: None


HEENT Surgical History: Reports: None, Cataract Surgery


Cardiovascular Surgical History: Reports: None


GI Surgical History: Reports: Colonoscopy


Other Male  Surgeries/Procedures: prostate problems


Musculoskeletal Surgical History: Reports: Other (See Below)


Other Musculoskeletal Surgeries/Procedures:: back surg, R) hip joint replacement





Social & Family History





- Family History


Family Medical History: Noncontributory


Other Hematologic Family History: mother  of cerebral aneurysm





- Tobacco Use


Smoking Status *Q: Former Smoker


Years of Tobacco use: 20


Packs/Tins Daily: 0.5


Used Tobacco, but Quit: Yes


Month Tobacco Last Used: when he was 41 years old


Second Hand Smoke Exposure: No





- Caffeine Use


Caffeine Use: Reports: Coffee, Tea





- Alcohol Use


Days Per Week of Alcohol Use: 0





- Recreational Drug Use


Recreational Drug Use: No





- Living Situation & Occupation


Occupation: Retired





H&P Review of Systems





- Review of Systems:


Review Of Systems: See Below


General: Denies: Fever, Chills


Pulmonary: Denies: Shortness of Breath


Cardiovascular: Denies: Chest Pain


Gastrointestinal: Reports: Anorexia, Decreased Appetite, Other (Anterior pelvic 

pain).  Denies: Black Stool, Difficulty Swallowing, Vomiting


Genitourinary: Denies: Dysuria, Frequency, Burning


Psychiatric: Reports: Confusion (in the evening)





Exam





- Exam


Exam: See Below





- Vital Signs


Vital Signs: 


 Last Vital Signs











Temp  36.9 C   17 13:32


 


Pulse  81   17 13:32


 


Resp  20   17 13:32


 


BP  155/68 H  17 13:32


 


Pulse Ox  95   17 13:32











Weight: 76.385 kg





- Exam


General: Alert, Oriented, Other (Poor historian)


Neck: Supple


Lungs: Clear to Auscultation, Normal Respiratory Effort


Cardiovascular: Regular Rate, Regular Rhythm


GI/Abdominal Exam: Normal Bowel Sounds, Soft, Tender, Other (Anterior pelvic 

tenderness).  No: No Distention, No Abnormal Bruit


 (Male) Exam: No: Testicular Mass


Extremities: No Pedal Edema


Skin: Warm, Dry


Neurological: Cranial Nerves Intact


Neuro Extensive - Mental Status: Alert, Oriented x3, Normal Mood/Affect


Psychiatric: Anxious





- Patient Data


Result Diagrams: 


 17 11:04





 17 11:04





*Q Meaningful Use (ADM)





- VTE *Q


VTE Criteria *Q: 








- Stroke *Q


Stroke Criteria *Q: 








- AMI *Q


AMI Criteria *Q: 








- Problem List


(1) Pelvic pain in male


SNOMED Code(s): 52971306


   ICD Code: R10.2 - PELVIC AND PERINEAL PAIN   Status: Acute   Current Visit: 

Yes   


Problem List Initiated/Reviewed/Updated: Yes


Orders Last 24hrs: 


 Active Orders 24 hr











 Category Date Time Status


 


 Patient Status [ADT] Routine ADT  17 14:23 Active


 


 Antiembolic Devices [RC] PER UNIT ROUTINE Care  17 14:25 Active


 


 Oxygen Therapy [RC] PRN Care  17 14:23 Active


 


 Peripheral IV Care [RC] .AS DIRECTED Care  17 14:25 Active


 


 VTE/DVT Education [RC] PER UNIT ROUTINE Care  17 14:23 Active


 


 Vital Signs [RC] Q4H Care  17 14:23 Active


 


 Regular Diet [DIET] Diet  17 Dinner Active


 


 CTA Abd Pelv w Cont [CT] Routine Exams  17 14:13 Ordered


 


 CULTURE BLOOD [BC] Stat Lab  17 14:37 Ordered


 


 CULTURE BLOOD [BC] Stat Lab  17 14:37 Ordered


 


 Acetaminophen [Tylenol] Med  17 14:23 Active





 650 mg PO Q4H PRN   


 


 Acetaminophen/oxyCODONE [Percocet 325-5 MG] Med  17 14:11 Active





 1 tab PO Q4H PRN   


 


 Belladonna/Opium [B & O Supprettes No. 16A] Med  17 21:00 Active





 1 supp RECTAL BID   


 


 Bicalutamide [Casodex] Med  17 09:00 Ordered





 1 tab PO DAILY   


 


 Ciprofloxacin in D5W [Cipro in D5W 400 MG/200 ML] 400 Med  17 21:00 

Active





 mg   





 Premix Bag 1 bag   





 IV Q12HR   


 


 Docusate Sodium/Sennosides [Senna Plus] Med  17 21:00 Active





 2 tab PO BEDTIME   


 


 Escitalopram [Lexapro] Med  17 09:00 Active





 20 mg PO DAILY   


 


 Heparin Sodium Med  17 22:00 Active





 5,000 units SUBCUT Q8HR   


 


 Omeprazole Med  17 06:00 Active





 20 mg PO ACBRK   


 


 Phenazopyridine [Urinary Pain Relief] Med  17 21:00 Active





 95 mg PO TID   


 


 Sodium Chloride 0.9% [Saline Flush] Med  17 14:23 Active





 10 ml FLUSH ASDIRECTED PRN   


 


 Tamsulosin [Flomax] Med  17 21:00 Active





 0.4 mg PO BEDTIME   


 


 Zolpidem [Ambien] Med  17 14:23 Active





 5 mg PO BEDTIME PRN   


 


 fentaNYL [Sublimaze] Med  17 14:26 Active





 25 mcg IVPUSH Q2H PRN   


 


 oxyCODONE ER [OxyCONTIN] Med  17 21:00 Active





 20 mg PO BID   


 


 Antiembolic Hose [OM.PC] Per Unit Routine Oth  17 14:24 Ordered


 


 Blood Culture x2 Reflex Set [OM.PC] Stat Oth  17 14:37 Ordered


 


 Peripheral IV Insertion Adult [OM.PC] Routine Oth  17 14:23 Ordered


 


 Saline Lock Insert [OM.PC] Routine Oth  17 14:23 Ordered


 


 Resuscitation Status Routine Resus Stat  17 14:23 Ordered








 Medication Orders





Acetaminophen (Tylenol)  650 mg PO Q4H PRN


   PRN Reason: Pain (Mild 1-3)/fever


Belladonna Alkaloids/Opium (B & O Supprettes No. 16a)  1 supp RECTAL BID [facility]


Escitalopram Oxalate (Lexapro)  20 mg PO DAILY [facility]


Fentanyl (Sublimaze)  25 mcg IVPUSH Q2H PRN


   PRN Reason: severe pain


Heparin Sodium (Porcine) (Heparin Sodium)  5,000 units SUBCUT Q8HR [facility]


Ciprofloxacin/Dextrose 400 mg/ (Premix)  200 mls @ 200 mls/hr IV Q12HR [facility]


Non-Formulary Medication (Bicalutamide [Casodex])  1 tab PO DAILY [facility]


Omeprazole (Omeprazole)  20 mg PO ACBRK [facility]


Oxycodone HCl (Oxycontin)  20 mg PO BID [facility]


Oxycodone/Acetaminophen (Percocet 325-5 Mg)  1 tab PO Q4H PRN


   PRN Reason: pelvic pain


Phenazopyridine HCl (Urinary Pain Relief)  95 mg PO TID [facility]


Senna/Docusate Sodium (Senna Plus)  2 tab PO BEDTIME [facility]


Sodium Chloride (Saline Flush)  10 ml FLUSH ASDIRECTED PRN


   PRN Reason: Keep Vein Open


Tamsulosin HCl (Flomax)  0.4 mg PO BEDTIME [facility]


Zolpidem Tartrate (Ambien)  5 mg PO BEDTIME PRN


   PRN Reason: Sleep








Assessment/Plan Comment:: 








Pelvic pain


The differential diagnosis is wide


This might represent constipation, cystitis, radiculopathy, radiation changes 

in the bone, pelvic fracture.


Recurrent malignancy.


We'll obtain CT of the abdomen and pelvis for further evaluation





Start the patient empirically for treatment for urinary tract infection, give B 

and O suppository and pyridium for possible spasms.


use oxycontin BID and oxycodone prn





possible UTI vs colonizoation vs. radiation cystitis


evaluate for uti wit blood and urine cx.


start on cipro





delirium with sundowning


might be due to dementia


narcotic use can exacerbate symptoms


will follow





dvt prophylaxis with sq heparin

## 2017-08-01 RX ADMIN — OXYCODONE HYDROCHLORIDE SCH MG: 20 TABLET, FILM COATED, EXTENDED RELEASE ORAL at 08:43

## 2017-08-01 RX ADMIN — OXYCODONE HYDROCHLORIDE AND ACETAMINOPHEN PRN TAB: 5; 325 TABLET ORAL at 11:29

## 2017-08-01 RX ADMIN — HEPARIN SODIUM SCH UNITS: 5000 INJECTION, SOLUTION INTRAVENOUS; SUBCUTANEOUS at 21:14

## 2017-08-01 RX ADMIN — OXYCODONE HYDROCHLORIDE SCH MG: 20 TABLET, FILM COATED, EXTENDED RELEASE ORAL at 20:31

## 2017-08-01 RX ADMIN — Medication PRN ML: at 09:45

## 2017-08-01 RX ADMIN — HEPARIN SODIUM SCH UNITS: 5000 INJECTION, SOLUTION INTRAVENOUS; SUBCUTANEOUS at 06:20

## 2017-08-01 RX ADMIN — CIPROFLOXACIN SCH MLS/HR: 2 INJECTION, SOLUTION INTRAVENOUS at 09:45

## 2017-08-01 RX ADMIN — CIPROFLOXACIN SCH MLS/HR: 2 INJECTION, SOLUTION INTRAVENOUS at 20:40

## 2017-08-01 RX ADMIN — HEPARIN SODIUM SCH UNITS: 5000 INJECTION, SOLUTION INTRAVENOUS; SUBCUTANEOUS at 13:12

## 2017-08-01 RX ADMIN — Medication PRN ML: at 10:51

## 2017-08-01 RX ADMIN — OXYCODONE HYDROCHLORIDE AND ACETAMINOPHEN PRN TAB: 5; 325 TABLET ORAL at 06:18

## 2017-08-01 RX ADMIN — Medication SCH: at 08:44

## 2017-08-01 RX ADMIN — Medication SCH EACH: at 20:33

## 2017-08-01 RX ADMIN — ATROPA BELLADONNA AND OPIUM SCH: 16.2; 6 SUPPOSITORY RECTAL at 08:44

## 2017-08-01 RX ADMIN — OMEPRAZOLE SCH MG: 20 CAPSULE, DELAYED RELEASE ORAL at 06:18

## 2017-08-01 RX ADMIN — OXYCODONE HYDROCHLORIDE AND ACETAMINOPHEN PRN TAB: 5; 325 TABLET ORAL at 22:58

## 2017-08-01 RX ADMIN — OXYCODONE HYDROCHLORIDE AND ACETAMINOPHEN PRN TAB: 5; 325 TABLET ORAL at 15:34

## 2017-08-01 RX ADMIN — Medication PRN ML: at 20:36

## 2017-08-01 RX ADMIN — OXYCODONE HYDROCHLORIDE AND ACETAMINOPHEN PRN TAB: 5; 325 TABLET ORAL at 02:00

## 2017-08-01 NOTE — PCM.PN
- General Info


Date of Service: 08/01/17


Admission Dx/Problem (Free Text): 


 Admission Diagnosis/Problem





Admission Diagnosis/Problem      UTI, Urinary tract infectious disease








Subjective Update: 





no fever


continued to have suprapubic pain


worse with movements, better with rest


has been present for weeks


no apparent trauma but has a h/o prostate ca





Functional Status: Denies: Pain Controlled





- Review of Systems


General: Denies: Fever


Pulmonary: Denies: Shortness of Breath


Cardiovascular: Denies: Chest Pain


Gastrointestinal: Denies: Abdominal Pain


Genitourinary: Denies: Dysuria





- Patient Data


Vitals - Most Recent: 


 Last Vital Signs











Temp  36.4 C   08/01/17 11:00


 


Pulse  78   08/01/17 11:00


 


Resp  20   08/01/17 11:00


 


BP  143/68 H  08/01/17 11:00


 


Pulse Ox  92 L  08/01/17 11:00











Weight - Most Recent: 76.385 kg


I&O - Last 24 Hours: 


 Intake & Output











 07/31/17 08/01/17 08/01/17





 22:59 06:59 14:59


 


Intake Total 200 550 190


 


Output Total 202 775 120


 


Balance -2 -225 70











Med Orders - Current: 


 Current Medications





Acetaminophen (Tylenol)  650 mg PO Q4H PRN


   PRN Reason: Pain (Mild 1-3)/fever


Escitalopram Oxalate (Lexapro)  20 mg PO DAILY [facility]


   Last Admin: 08/01/17 08:43 Dose:  20 mg


Fentanyl (Sublimaze)  25 mcg IVPUSH Q2H PRN


   PRN Reason: severe pain


Heparin Sodium (Porcine) (Heparin Sodium)  5,000 units SUBCUT Q8HR [facility]


   Last Admin: 08/01/17 06:20 Dose:  5,000 units


Ciprofloxacin/Dextrose 400 mg/ (Premix)  200 mls @ 200 mls/hr IV Q12HR [facility]


   Last Admin: 08/01/17 09:45 Dose:  200 mls/hr


Omeprazole (Omeprazole)  20 mg PO ACBRK [facility]


   Last Admin: 08/01/17 06:18 Dose:  20 mg


Oxycodone HCl (Oxycontin)  20 mg PO BID [facility]


   Last Admin: 08/01/17 08:43 Dose:  20 mg


Oxycodone/Acetaminophen (Percocet 325-5 Mg)  1 tab PO Q4H PRN


   PRN Reason: pelvic pain


   Last Admin: 08/01/17 11:29 Dose:  1 tab


Bicalutamide 50 Mg * (*Own Med**)  1 each PO BEDTIME [facility]


   Last Admin: 07/31/17 22:30 Dose:  1 each


Senna/Docusate Sodium (Senna Plus)  2 tab PO BEDTIME [facility]


   Last Admin: 07/31/17 20:42 Dose:  2 tab


Sodium Chloride (Saline Flush)  10 ml FLUSH ASDIRECTED PRN


   PRN Reason: Keep Vein Open


   Last Admin: 08/01/17 10:51 Dose:  10 ml


Tamsulosin HCl (Flomax)  0.4 mg PO BEDTIME [facility]


   Last Admin: 07/31/17 20:43 Dose:  0.4 mg


Zolpidem Tartrate (Ambien)  5 mg PO BEDTIME PRN


   PRN Reason: Sleep





Discontinued Medications





Barium Sulfate (Readi-Cat 2)  900 ml PO ONETIME ONE


   Stop: 07/31/17 14:34


   Last Admin: 07/31/17 14:47 Dose:  900 ml


Belladonna Alkaloids/Opium (B & O Supprettes No. 16a)  1 supp RECTAL BID [facility]


   Last Admin: 08/01/17 08:44 Dose:  Not Given


Ciprofloxacin (Ciprofloxacin Hcl)  500 mg PO ONETIME ONE


   Stop: 07/31/17 12:16


   Last Admin: 07/31/17 12:32 Dose:  500 mg


Iopamidol (Isovue-300 (61%))  75 ml IVPUSH ONETIME ONE


   Stop: 07/31/17 14:35


   Last Admin: 07/31/17 16:19 Dose:  75 ml


Non-Formulary Medication (Bicalutamide [Casodex])  1 tab PO DAILY [facility]


Phenazopyridine HCl (Urinary Pain Relief)  95 mg PO TID [facility]


   Last Admin: 08/01/17 08:44 Dose:  Not Given











- Exam


General: Alert, Oriented


Neck: Supple


Lungs: Clear to Auscultation


Cardiovascular: Regular Rate, Regular Rhythm


Extremities: No Pedal Edema





- Problem List & Annotations


(1) Pelvic pain in male


SNOMED Code(s): 83778589


   Code(s): R10.2 - PELVIC AND PERINEAL PAIN   Status: Acute   Current Visit: 

Yes   





(2) Pelvic fracture


SNOMED Code(s): 91564249


   Code(s): S32.9XXA - FRACTURE OF UNSP PARTS OF LUMBOSACRAL SPINE AND PELVIS, 

INIT   Status: Acute   Current Visit: Yes   





- Problem List Review


Problem List Initiated/Reviewed/Updated: Yes





- My Orders


Last 24 Hours: 


My Active Orders





07/31/17 11:04


CULTURE BLOOD [BC] Stat 





07/31/17 14:26


fentaNYL [Sublimaze]   25 mcg IVPUSH Q2H PRN 





07/31/17 14:37


Blood Culture x2 Reflex Set [OM.PC] Stat 





07/31/17 15:00


CULTURE BLOOD [BC] Stat 





07/31/17 21:30


Patient's Own Medication [Ptom]   1 each PO BEDTIME 





08/01/17 11:45


Lidocaine 5% [Lidoderm 5%]   700 mg TOP DAILY 





08/02/17 05:15


BASIC METABOLIC PANEL,BMP [CHEM] AM 


CBC WITH AUTO DIFF [HEME] AM 














- Plan


Plan:: 








Pelvic pain


CT showed pubic bone fracture


possible pathologic with prostate cancer


use oxycontin BID and oxycodone prn


start pt/ot


start lidoderm patch





possible UTI vs colonization vs. radiation cystitis


pending blood and urine cx.


started on cipro





delirium with sundowning


might be due to dementia


narcotic use can exacerbate symptoms


will follow





dvt prophylaxis with sq heparin
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
## 2017-08-02 LAB
CHLORIDE SERPL-SCNC: 98 MMOL/L (ref 101–111)
SODIUM SERPL-SCNC: 138 MMOL/L (ref 135–145)

## 2017-08-02 RX ADMIN — OMEPRAZOLE SCH MG: 20 CAPSULE, DELAYED RELEASE ORAL at 07:19

## 2017-08-02 RX ADMIN — Medication PRN ML: at 19:51

## 2017-08-02 RX ADMIN — CIPROFLOXACIN SCH MLS/HR: 2 INJECTION, SOLUTION INTRAVENOUS at 10:03

## 2017-08-02 RX ADMIN — CIPROFLOXACIN SCH MLS/HR: 2 INJECTION, SOLUTION INTRAVENOUS at 21:01

## 2017-08-02 RX ADMIN — HEPARIN SODIUM SCH UNITS: 5000 INJECTION, SOLUTION INTRAVENOUS; SUBCUTANEOUS at 13:19

## 2017-08-02 RX ADMIN — OXYCODONE HYDROCHLORIDE SCH MG: 20 TABLET, FILM COATED, EXTENDED RELEASE ORAL at 20:58

## 2017-08-02 RX ADMIN — HEPARIN SODIUM SCH UNITS: 5000 INJECTION, SOLUTION INTRAVENOUS; SUBCUTANEOUS at 07:18

## 2017-08-02 RX ADMIN — OXYCODONE HYDROCHLORIDE SCH MG: 20 TABLET, FILM COATED, EXTENDED RELEASE ORAL at 10:04

## 2017-08-02 RX ADMIN — OXYCODONE HYDROCHLORIDE AND ACETAMINOPHEN PRN TAB: 5; 325 TABLET ORAL at 13:18

## 2017-08-02 RX ADMIN — HEPARIN SODIUM SCH UNITS: 5000 INJECTION, SOLUTION INTRAVENOUS; SUBCUTANEOUS at 21:00

## 2017-08-02 RX ADMIN — OXYCODONE HYDROCHLORIDE AND ACETAMINOPHEN PRN TAB: 5; 325 TABLET ORAL at 19:15

## 2017-08-02 RX ADMIN — Medication SCH EACH: at 21:05

## 2017-08-02 NOTE — PCM.PN
- General Info


Date of Service: 08/02/17


Admission Dx/Problem (Free Text): 


 Admission Diagnosis/Problem





Admission Diagnosis/Problem      UTI, Urinary tract infectious disease








Subjective Update: 





no fever


continued to have suprapubic pain


worse with movements, better with rest


has been present for weeks


no apparent trauma but has a h/o prostate ca





last evening had increased confusion, delirium, agitation


improved after valium, zyprexa








- Review of Systems


General: Denies: Fever


Pulmonary: Denies: Shortness of Breath


Cardiovascular: Denies: Chest Pain


Gastrointestinal: Denies: Abdominal Pain





- Patient Data


Vitals - Most Recent: 


 Last Vital Signs











Temp  36.2 C   08/02/17 11:00


 


Pulse  76   08/02/17 11:00


 


Resp  20   08/02/17 11:00


 


BP  118/61   08/02/17 11:00


 


Pulse Ox  92 L  08/02/17 11:00











Weight - Most Recent: 76.385 kg


I&O - Last 24 Hours: 


 Intake & Output











 08/01/17 08/02/17 08/02/17





 22:59 06:59 14:59


 


Intake Total 649  190


 


Output Total 240  


 


Balance 409  190











Lab Results Last 24 Hours: 


 Laboratory Results - last 24 hr











  08/02/17 08/02/17 Range/Units





  08:37 08:37 


 


WBC  9.1   (5.0-10.0)  10^3/uL


 


RBC  4.36 L   (4.6-6.2)  10^6/uL


 


Hgb  12.3 L   (14.0-18.0)  g/dL


 


Hct  38.5 L   (40.0-54.0)  %


 


MCV  88.3   ()  fL


 


MCH  28.2   (27.0-34.0)  pg


 


MCHC  31.9 L   (33.0-35.0)  g/dL


 


Plt Count  292   (150-450)  10^3/uL


 


Neut % (Auto)  76.1 H   (42.2-75.2)  %


 


Lymph % (Auto)  12.2 L   (20.5-50.1)  %


 


Mono % (Auto)  7.4   (2-8)  %


 


Eos % (Auto)  4.0 H   (1.0-3.0)  %


 


Baso % (Auto)  0.3   (0.0-1.0)  %


 


Sodium   138  (135-145)  mmol/L


 


Potassium   4.2  (3.6-5.0)  mmol/L


 


Chloride   98 L  (101-111)  mmol/L


 


Carbon Dioxide   29.0  (21.0-31.0)  mmol/L


 


Anion Gap   15.2  


 


BUN   11  (7-18)  mg/dL


 


Creatinine   1.0  (0.6-1.3)  mg/dL


 


Est Cr Clr Drug Dosing   57.00  mL/min


 


Estimated GFR (MDRD)   > 60  


 


Glucose   193 H  ()  mg/dL


 


Calcium   9.3  (8.4-10.2)  mg/dl











Ezekiel Results Last 24 Hours: 


 Microbiology











 07/31/17 15:00 Aerobic Blood Culture - Preliminary





 Blood - Venous - Lab Draw    NO GROWTH AFTER 1 DAY





 Anaerobic Blood Culture - Preliminary





    NO GROWTH AFTER 1 DAY











Med Orders - Current: 


 Current Medications





Acetaminophen (Tylenol)  650 mg PO Q4H PRN


   PRN Reason: Pain (Mild 1-3)/fever


Diazepam (Valium.)  5 mg PO Q6H PRN


   PRN Reason: Agitation


   Last Admin: 08/01/17 15:37 Dose:  5 mg


Escitalopram Oxalate (Lexapro)  20 mg PO DAILY [facility]


   Last Admin: 08/02/17 10:04 Dose:  20 mg


Fentanyl (Sublimaze)  25 mcg IVPUSH Q2H PRN


   PRN Reason: severe pain


Heparin Sodium (Porcine) (Heparin Sodium)  5,000 units SUBCUT Q8HR [facility]


   Last Admin: 08/02/17 07:18 Dose:  5,000 units


Ciprofloxacin/Dextrose 400 mg/ (Premix)  200 mls @ 200 mls/hr IV Q12HR [facility]


   Last Admin: 08/02/17 10:03 Dose:  200 mls/hr


Lidocaine (Lidoderm 5%)  700 mg TOP DAILY [facility]


   Last Admin: 08/02/17 10:03 Dose:  700 mg


Miscellaneous Information (Remove Patch)  1 ea TRDERM BEDTIME [facility]


   Last Admin: 08/01/17 20:48 Dose:  Not Given


Olanzapine (Zyprexa)  5 mg IM Q6H PRN


   PRN Reason: agitation, anxiety


   Last Admin: 08/01/17 22:51 Dose:  5 mg


Omeprazole (Omeprazole)  20 mg PO ACBRK [facility]


   Last Admin: 08/02/17 07:19 Dose:  20 mg


Oxycodone HCl (Oxycontin)  20 mg PO BID [facility]


   Last Admin: 08/02/17 10:04 Dose:  20 mg


Oxycodone/Acetaminophen (Percocet 325-5 Mg)  1 tab PO Q4H PRN


   PRN Reason: pelvic pain


   Last Admin: 08/01/17 22:58 Dose:  1 tab


Bicalutamide 50 Mg * (*Own Med**)  1 each PO BEDTIME [facility]


   Last Admin: 08/01/17 20:33 Dose:  1 each


Senna/Docusate Sodium (Senna Plus)  2 tab PO BEDTIME [facility]


   Last Admin: 08/01/17 20:31 Dose:  2 tab


Sodium Chloride (Saline Flush)  10 ml FLUSH ASDIRECTED PRN


   PRN Reason: Keep Vein Open


   Last Admin: 08/01/17 20:36 Dose:  10 ml


Tamsulosin HCl (Flomax)  0.4 mg PO BEDTIME [facility]


   Last Admin: 08/01/17 20:31 Dose:  0.4 mg


Zolpidem Tartrate (Ambien)  5 mg PO BEDTIME PRN


   PRN Reason: Sleep


   Last Admin: 08/01/17 21:14 Dose:  5 mg





Discontinued Medications





Barium Sulfate (Readi-Cat 2)  900 ml PO ONETIME ONE


   Stop: 07/31/17 14:34


   Last Admin: 07/31/17 14:47 Dose:  900 ml


Belladonna Alkaloids/Opium (B & O Supprettes No. 16a)  1 supp RECTAL BID [facility]


   Last Admin: 08/01/17 08:44 Dose:  Not Given


Ciprofloxacin (Ciprofloxacin Hcl)  500 mg PO ONETIME ONE


   Stop: 07/31/17 12:16


   Last Admin: 07/31/17 12:32 Dose:  500 mg


Diazepam (Valium.)  5 mg PO ONETIME ONE


   Stop: 08/01/17 20:26


   Last Admin: 08/01/17 20:31 Dose:  5 mg


Iopamidol (Isovue-300 (61%))  75 ml IVPUSH ONETIME ONE


   Stop: 07/31/17 14:35


   Last Admin: 07/31/17 16:19 Dose:  75 ml


Non-Formulary Medication (Bicalutamide [Casodex])  1 tab PO DAILY [facility]


Phenazopyridine HCl (Urinary Pain Relief)  95 mg PO TID [facility]


   Last Admin: 08/01/17 08:44 Dose:  Not Given











- Exam


General: Alert, Oriented, Other (poor insight and poor short term memory, needs 

repeated reorientation)


Neck: Supple


Lungs: Clear to Auscultation, Normal Respiratory Effort


Cardiovascular: Regular Rate, Regular Rhythm


Extremities: Normal Inspection, No Pedal Edema


Skin: Warm, Dry


Psy/Mental Status: Alert





- Problem List & Annotations


(1) Pelvic pain in male


SNOMED Code(s): 44827289


   Code(s): R10.2 - PELVIC AND PERINEAL PAIN   Status: Acute   Current Visit: 

Yes   





(2) Pelvic fracture


SNOMED Code(s): 80613551


   Code(s): S32.9XXA - FRACTURE OF UNSP PARTS OF LUMBOSACRAL SPINE AND PELVIS, 

INIT   Status: Acute   Current Visit: Yes   





(3) Dementia


SNOMED Code(s): 99815966


   Code(s): F03.90 - UNSPECIFIED DEMENTIA WITHOUT BEHAVIORAL DISTURBANCE   

Status: Acute   Current Visit: Yes   





- Problem List Review


Problem List Initiated/Reviewed/Updated: Yes





- My Orders


Last 24 Hours: 


My Active Orders





08/01/17 11:45


Lidocaine 5% [Lidoderm 5%]   700 mg TOP DAILY 





08/01/17 15:18


Diazepam [Valium]   5 mg PO Q6H PRN 





08/01/17 21:00


Remove Patch   1 ea TRDERM BEDTIME 





08/01/17 22:36


OLANZapine [ZyPREXA]   5 mg IM Q6H PRN 














- Plan


Plan:: 








Pelvic pain


CT showed pubic bone fracture


possible pathologic with prostate cancer


use oxycontin BID and oxycodone prn


cont pt/ot


cont lidoderm patch





possible UTI vs colonization vs. radiation cystitis


pending blood cx


urine cx.: contaminant


started on cipro





delirium with sundowning


likely underlying dementia with h/o anxiety/depression


narcotic use can exacerbate symptoms


cont lexapro


prn zyprexa if needed at night


will follow





dvt prophylaxis with sq heparin

## 2017-08-03 VITALS — DIASTOLIC BLOOD PRESSURE: 66 MMHG | SYSTOLIC BLOOD PRESSURE: 140 MMHG

## 2017-08-03 RX ADMIN — Medication PRN ML: at 09:10

## 2017-08-03 RX ADMIN — OXYCODONE HYDROCHLORIDE SCH MG: 20 TABLET, FILM COATED, EXTENDED RELEASE ORAL at 09:07

## 2017-08-03 RX ADMIN — CIPROFLOXACIN SCH: 2 INJECTION, SOLUTION INTRAVENOUS at 09:50

## 2017-08-03 RX ADMIN — OXYCODONE HYDROCHLORIDE AND ACETAMINOPHEN PRN TAB: 5; 325 TABLET ORAL at 08:08

## 2017-08-03 RX ADMIN — HEPARIN SODIUM SCH: 5000 INJECTION, SOLUTION INTRAVENOUS; SUBCUTANEOUS at 08:08

## 2017-08-03 RX ADMIN — OMEPRAZOLE SCH MG: 20 CAPSULE, DELAYED RELEASE ORAL at 08:08

## 2017-08-03 NOTE — [email or web address]
**Discharge Summary





- Hospital Course


Free Text/Narrative:: 








presented with anterior pelvic  pain





Pelvic pain


CT showed pubic bone fracture


possible pathologic with prostate cancer


use oxycontin BID and oxycodone prn


cont lidoderm patch





possible UTI vs colonization vs. radiation cystitis


blood cx: neg


urine cx.: contaminant


started on cipro - will fnish a few days tx.





delirium with sundowning


likely underlying dementia with h/o anxiety/depression


narcotic use can exacerbate symptoms


cont lexapro


prn zyprexa if needed at night - I think it will be useful for a few days while 

adjusting to new environment

















- Discharge Data


Discharge Date: 08/03/17


Discharge Disposition: DC/[facility]


Condition: Stable





- Discharge Diagnosis/Problem(s)


(1) Pelvic pain in male


SNOMED Code(s): 97778953


   ICD Code: R10.2 - PELVIC AND PERINEAL PAIN   Status: Acute   Current Visit: 

Yes   





(2) Pelvic fracture


SNOMED Code(s): 91757610


   ICD Code: S32.9XXA - FRACTURE OF UNSP PARTS OF LUMBOSACRAL SPINE AND PELVIS, 

INIT   Status: Acute   Current Visit: Yes   





(3) Dementia


SNOMED Code(s): 64304855


   ICD Code: F03.90 - UNSPECIFIED DEMENTIA WITHOUT BEHAVIORAL DISTURBANCE   

Status: Acute   Current Visit: Yes   





- Patient Instructions


Diet: Usual Diet as Tolerated


Activity: As Tolerated





- Discharge Plan


Prescriptions/Med Rec: 


Ciprofloxacin HCl [Cipro] 250 mg PO BID #6 tablet


Lidocaine 5% [Lidoderm 5%] 700 mg TOP DAILY #6 patch


OLANZapine [ZyPREXA] 5 mg PO BEDTIME PRN #5 tablet


 PRN Reason: for sleep


Zolpidem [Ambien] 5 mg PO BEDTIME PRN #30 tablet


 PRN Reason: for sleep


Home Medications: 


 Home Meds





Omeprazole [Prilosec] 20 mg PO DAILY 03/21/14 [History]


Tamsulosin [Flomax] 0.4 mg PO BEDTIME 03/21/14 [History]


Bicalutamide [Casodex] 1 tab PO DAILY 02/02/16 [History]


Docusate Sodium/Sennosides [Senna Plus] 2 tab PO BEDTIME #30 tablet 02/03/16 [Rx

]


Escitalopram Oxalate [Lexapro] 1 tab PO DAILY 04/25/17 [History]


Hydrocodone/Acetaminophen [Norco 5-325] 1 each PO Q6HR PRN 05/16/17 [History]


oxyCODONE HCl [Oxycontin] 1 tab PO BID 07/31/17 [History]


Ciprofloxacin HCl [Cipro] 250 mg PO BID #6 tablet 08/03/17 [Rx]


Lidocaine 5% [Lidoderm 5%] 700 mg TOP DAILY #6 patch 08/03/17 [Rx]


OLANZapine [ZyPREXA] 5 mg PO BEDTIME PRN #5 tablet 08/03/17 [Rx]


Zolpidem [Ambien] 5 mg PO BEDTIME PRN #30 tablet 08/03/17 [Rx]











- Discharge Summary/Plan Comment


DC Time >30 min.: Yes (filling out NH transfer, referral documents)





- General Info


Date of Service: 08/03/17


Admission Dx/Problem (Free Text: 


 Admission Diagnosis/Problem





Admission Diagnosis/Problem      UTI, Urinary tract infectious disease








Subjective Update: 





no fever


continued to have suprapubic pain that is worse with movements, better with rest


has been present for weeks, improved since admission 


no apparent trauma but has a h/o prostate ca





slept well after valium, zyprexa








- Review of Systems


General: Denies: Fever


Pulmonary: Denies: Shortness of Breath


Cardiovascular: Denies: Chest Pain


Gastrointestinal: Denies: Abdominal Pain


Genitourinary: Denies: Dysuria





- Patient Data


Vitals - Most Recent: 


 Last Vital Signs











Temp  37.0 C   08/03/17 08:13


 


Pulse  78   08/03/17 08:13


 


Resp  20   08/03/17 08:13


 


BP  140/66   08/03/17 08:13


 


Pulse Ox  94 L  08/03/17 08:13











Weight - Most Recent: 76.385 kg


I&O - Last 24 hours: 


 Intake & Output











 08/02/17 08/03/17 08/03/17





 22:59 06:59 14:59


 


Intake Total  100 


 


Output Total 100 300 


 


Balance -100 -200 











VALERIE Results - Last 24 hrs: 


 Microbiology











 07/31/17 15:00 Aerobic Blood Culture - Preliminary





 Blood - Venous - Lab Draw    NO GROWTH AFTER 2 DAYS





 Anaerobic Blood Culture - Preliminary





    NO GROWTH AFTER 2 DAYS











Med Orders - Current: 


 Current Medications





Acetaminophen (Tylenol)  650 mg PO Q4H PRN


   PRN Reason: Pain (Mild 1-3)/fever


   Last Admin: 08/02/17 16:36 Dose:  650 mg


Diazepam (Valium.)  5 mg PO Q6H PRN


   PRN Reason: Agitation


   Last Admin: 08/02/17 19:56 Dose:  5 mg


Escitalopram Oxalate (Lexapro)  20 mg PO DAILY [facility]


   Last Admin: 08/03/17 09:07 Dose:  20 mg


Fentanyl (Sublimaze)  25 mcg IVPUSH Q2H PRN


   PRN Reason: severe pain


Heparin Sodium (Porcine) (Heparin Sodium)  5,000 units SUBCUT Q8HR [facility]


   Last Admin: 08/03/17 08:08 Dose:  Not Given


Ciprofloxacin/Dextrose 400 mg/ (Premix)  200 mls @ 200 mls/hr IV Q12HR [facility]


   Last Admin: 08/02/17 21:01 Dose:  200 mls/hr


Lidocaine (Lidoderm 5%)  700 mg TOP DAILY [facility]


   Last Admin: 08/03/17 09:08 Dose:  700 mg


Miscellaneous Information (Remove Patch)  1 ea TRDERM BEDTIME [facility]


   Last Admin: 08/02/17 21:01 Dose:  Not Given


Olanzapine (Zyprexa)  5 mg IM Q6H PRN


   PRN Reason: agitation, anxiety


   Last Admin: 08/02/17 20:58 Dose:  5 mg


Omeprazole (Omeprazole)  20 mg PO ACBRK [facility]


   Last Admin: 08/03/17 08:08 Dose:  20 mg


Oxycodone HCl (Oxycontin)  20 mg PO BID [facility]


   Last Admin: 08/03/17 09:07 Dose:  20 mg


Oxycodone/Acetaminophen (Percocet 325-5 Mg)  1 tab PO Q4H PRN


   PRN Reason: pelvic pain


   Last Admin: 08/03/17 08:08 Dose:  1 tab


Bicalutamide 50 Mg * (*Own Med**)  1 each PO BEDTIME [facility]


   Last Admin: 08/02/17 21:05 Dose:  1 each


Senna/Docusate Sodium (Senna Plus)  2 tab PO BEDTIME [facility]


   Last Admin: 08/02/17 20:57 Dose:  2 tab


Sodium Chloride (Saline Flush)  10 ml FLUSH ASDIRECTED PRN


   PRN Reason: Keep Vein Open


   Last Admin: 08/03/17 09:10 Dose:  10 ml


Tamsulosin HCl (Flomax)  0.4 mg PO BEDTIME [facility]


   Last Admin: 08/02/17 20:57 Dose:  0.4 mg


Zolpidem Tartrate (Ambien)  5 mg PO BEDTIME PRN


   PRN Reason: Sleep


   Last Admin: 08/02/17 20:57 Dose:  5 mg





Discontinued Medications





Barium Sulfate (Readi-Cat 2)  900 ml PO ONETIME ONE


   Stop: 07/31/17 14:34


   Last Admin: 07/31/17 14:47 Dose:  900 ml


Belladonna Alkaloids/Opium (B & O Supprettes No. 16a)  1 supp RECTAL BID [facility]


   Last Admin: 08/01/17 08:44 Dose:  Not Given


Ciprofloxacin (Ciprofloxacin Hcl)  500 mg PO ONETIME ONE


   Stop: 07/31/17 12:16


   Last Admin: 07/31/17 12:32 Dose:  500 mg


Diazepam (Valium.)  5 mg PO ONETIME ONE


   Stop: 08/01/17 20:26


   Last Admin: 08/01/17 20:31 Dose:  5 mg


Iopamidol (Isovue-300 (61%))  75 ml IVPUSH ONETIME ONE


   Stop: 07/31/17 14:35


   Last Admin: 07/31/17 16:19 Dose:  75 ml


Non-Formulary Medication (Bicalutamide [Casodex])  1 tab PO DAILY [facility]


Phenazopyridine HCl (Urinary Pain Relief)  95 mg PO TID [facility]


   Last Admin: 08/01/17 08:44 Dose:  Not Given











- Exam


General: Reports: Alert, Oriented


Neck: Reports: Supple


Lungs: Reports: Clear to Auscultation, Normal Respiratory Effort


GI/Abdominal Exam: Normal Bowel Sounds, Soft, Non-Tender


Extremities: No Pedal Edema


Skin: Reports: Warm, Dry


Neurological: Reports: No New Focal Deficit


Psy/Mental Status: Reports: Alert, Normal Affect, Normal Mood





*Q Meaningful Use (DIS)





- VTE *Q


VTE Criteria *Q: 








- Stroke *Q


Stroke Criteria *Q: 








- AMI *Q


AMI Criteria *Q:

## 2017-08-15 ENCOUNTER — HOSPITAL ENCOUNTER (EMERGENCY)
Dept: HOSPITAL 43 - DL.ED | Age: 82
Discharge: NURSING, REHAB OR LONG-TERM CARE | End: 2017-08-15
Payer: MEDICARE

## 2017-08-15 VITALS — DIASTOLIC BLOOD PRESSURE: 55 MMHG | SYSTOLIC BLOOD PRESSURE: 107 MMHG

## 2017-08-15 DIAGNOSIS — Z88.8: ICD-10-CM

## 2017-08-15 DIAGNOSIS — Z79.899: ICD-10-CM

## 2017-08-15 DIAGNOSIS — Z98.890: ICD-10-CM

## 2017-08-15 DIAGNOSIS — F32.9: ICD-10-CM

## 2017-08-15 DIAGNOSIS — F03.91: ICD-10-CM

## 2017-08-15 DIAGNOSIS — N39.0: ICD-10-CM

## 2017-08-15 DIAGNOSIS — Z88.5: ICD-10-CM

## 2017-08-15 DIAGNOSIS — Z87.440: ICD-10-CM

## 2017-08-15 DIAGNOSIS — K21.9: ICD-10-CM

## 2017-08-15 DIAGNOSIS — Z96.641: ICD-10-CM

## 2017-08-15 DIAGNOSIS — E78.00: ICD-10-CM

## 2017-08-15 DIAGNOSIS — Z87.891: ICD-10-CM

## 2017-08-15 DIAGNOSIS — H54.7: ICD-10-CM

## 2017-08-15 DIAGNOSIS — G89.4: Primary | ICD-10-CM

## 2017-08-15 DIAGNOSIS — Z98.49: ICD-10-CM

## 2017-08-15 LAB
CHLORIDE SERPL-SCNC: 98 MMOL/L (ref 101–111)
SODIUM SERPL-SCNC: 134 MMOL/L (ref 135–145)

## 2017-08-15 NOTE — EDM.PDOC
ED HPI GENERAL MEDICAL PROBLEM





- General


Chief Complaint: Lower Extremity Injury/Pain


Stated Complaint: PAIN


Time Seen by Provider: 08/15/17 19:15


Source of Information: Reports: Patient, EMS, Family, Nursing Home Records


History Limitations: Reports: Altered Mental Status (patient confused,)





- History of Present Illness


INITIAL COMMENTS - FREE TEXT/NARRATIVE: 





Patient transferred to ED per family request due to uncontrolled pain. Patient 

denies pain on arrival. Hx dementia and chronic pain from prostate CA and 

pelvic fracture. Family report strong dissatisfaction with NH and not being 

able to get help in timely manner and having to wait long periods for pain 

medications. Patient was ordered to have Fentynal patch placed today but refused

, no order discontinued. Last pain pil was 6 pm. Last prn hydrocodone at 1245. 

Family noted that patient has been failing in NH setting with skin breakdown 

and progressively getting weaker instead of better.  Briefs usually saturated. 

noted some clots today. Appointment in am with neuro psych and 2pm in [place] with 

oncologist. TC with NH staff and Dr. Durán , patient was out of nH wandering 

this afternoon. Patient refused fentanyl patch be initiated todayas "people die 

from that". 


Duration: Chronic


  ** Right Hip


Pain Score (Numeric/FACES): 5





- Related Data


 Allergies











Allergy/AdvReac Type Severity Reaction Status Date / Time


 


lorazepam [From Ativan] Allergy  Confusion Verified 08/15/17 18:51


 


morphine Allergy  Confusion Verified 08/15/17 18:51


 


nitrofurantoin Allergy  Other Verified 08/15/17 18:51





[From Macrobid]     


 


oxybutynin Allergy  Cannot Verified 08/15/17 18:51





   Remember  


 


phenazopyridine Allergy  Other Verified 08/15/17 18:51





[From Pyridium]     











Home Meds: 


 Home Meds





Omeprazole [Prilosec] 20 mg PO DAILY 14 [History]


Tamsulosin [Flomax] 0.4 mg PO BEDTIME 14 [History]


Bicalutamide [Casodex] 1 tab PO DAILY 16 [History]


Docusate Sodium/Sennosides [Senna Plus] 2 tab PO BEDTIME #30 tablet 16 [Rx

]


Escitalopram Oxalate [Lexapro] 1 tab PO DAILY 17 [History]


Hydrocodone/Acetaminophen [Norco 5-325] 1 each PO Q6HR PRN 17 [History]


oxyCODONE HCl [Oxycontin] 1 tab PO TID 17 [History]


Lidocaine 5% [Lidoderm 5%] 700 mg TOP DAILY #6 patch 17 [Rx]


Zolpidem [Ambien] 5 mg PO BEDTIME PRN #30 tablet 17 [Rx]


Magnesium Hydroxide [Milk of Magnesia] 30 ml PO DAILY PRN 08/15/17 [History]


OLANZapine [ZyPREXA] 5 mg PO BEDTIME PRN 08/15/17 [History]


fentaNYL [Fentanyl] 25 mcg TD Q72H 08/15/17 [History]











Past Medical History


HEENT History: Reports: Cataract, Impaired Vision, Other (See Below)


Other HEENT History: wears glasses


Cardiovascular History: Reports: High Cholesterol


Respiratory History: Reports: None


Gastrointestinal History: Reports: Chronic Constipation, GERD


Genitourinary History: Reports: BPH, UTI, Recurrent, Other (See Below)


Other Genitourinary History: dilalation of urethra


Musculoskeletal History: Reports: Back Pain, Chronic, Fracture


Other Musculoskeletal History: left femur surgery


Neurological History: Reports: None


Psychiatric History: Reports: Depression


Endocrine/Metabolic History: Reports: None


Hematologic History: Reports: None


Immunologic History: Reports: None


Oncologic (Cancer) History: Reports: Prostate


Dermatologic History: Reports: None





- Infectious Disease History


Infectious Disease History: Reports: Chicken Pox, Measles, Mumps, Other (See 

Below)





- Past Surgical History


Head Surgeries/Procedures: Reports: None


HEENT Surgical History: Reports: None, Cataract Surgery


Cardiovascular Surgical History: Reports: None


GI Surgical History: Reports: Colonoscopy


Other Male  Surgeries/Procedures: prostate problems


Musculoskeletal Surgical History: Reports: Other (See Below)


Other Musculoskeletal Surgeries/Procedures:: back surg, R) hip joint replacement





Social & Family History





- Family History


Family Medical History: Noncontributory


Other Hematologic Family History: mother  of cerebral aneurysm





- Tobacco Use


Smoking Status *Q: Former Smoker


Years of Tobacco use: 20


Packs/Tins Daily: 0.5


Used Tobacco, but Quit: No


Month Tobacco Last Used: when he was 41 years old


Second Hand Smoke Exposure: No





- Caffeine Use


Caffeine Use: Reports: Coffee, Soda, Tea





- Alcohol Use


Days Per Week of Alcohol Use: 0





- Recreational Drug Use


Recreational Drug Use: No





- Living Situation & Occupation


Occupation: Retired





Review of Systems





- Review of Systems


Review Of Systems: ROS reveals no pertinent complaints other than HPI.


Eyes: Reports: No Symptoms


Ears: Reports: No Symptoms


Nose: Reports: No Symptoms


Mouth/Throat: Reports: No Symptoms


Respiratory: Reports: No Symptoms


Cardiovascular: Reports: No Symptoms


GI/Abdominal: Reports: No Symptoms


Genitourinary: Reports: Hematuria, Incontinence


Musculoskeletal: Reports: Other (pelvic, right hip pain)


Skin: Reports: Other (healing pressure area to coccygeal fold, dried 

excoriation  left inner buttock. )


Neurological: Reports: Confusion


Psychiatric: Reports: Anxiety





ED EXAM, GENERAL





- Physical Exam


Exam: See Below


Exam Limited By: No Limitations


General Appearance: Alert, No Apparent Distress, Anxious


Eye Exam: Bilateral Eye: EOMI, PERRL


Ears: Normal External Exam


Nose: Normal Inspection


Throat/Mouth: Normal Inspection


Head: Atraumatic, Normocephalic


Neck: Normal Inspection, Supple, Full Range of Motion


Respiratory/Chest: No Respiratory Distress, Lungs Clear, Normal Breath Sounds


Cardiovascular: Normal Peripheral Pulses, Regular Rate, Rhythm, No Edema


GI/Abdominal: Normal Bowel Sounds, Soft, Non-Tender


Back Exam: Normal Inspection.  No: CVA Tenderness (L), CVA Tenderness (R)


Extremities: Normal Inspection, Pedal Edema (trace)


Neurological: Alert, Confused, Disoriented.  No: Oriented (person only)


Psychiatric: Anxious


Skin Exam: Warm, Dry, Intact, Normal Color





Course





- Vital Signs


Last Recorded V/S: 


 Last Vital Signs











Temp  98.2 F   08/15/17 20:26


 


Pulse  79   08/15/17 20:26


 


Resp  18   08/15/17 20:26


 


BP  107/55 L  08/15/17 20:26


 


Pulse Ox  90 L  08/15/17 20:26














- Orders/Labs/Meds


Orders: 


 Active Orders 24 hr











 Category Date Time Status


 


 CULTURE BLOOD [BC] Stat Lab  08/15/17 19:35 Received


 


 CULTURE BLOOD [BC] Stat Lab  08/15/17 19:40 Received


 


 CULTURE URINE [RM] Stat Lab  08/15/17 19:10 Received


 


 Blood Culture x2 Reflex Set [OM.PC] Stat Oth  08/15/17 19:23 Ordered











Labs: 


 Laboratory Tests











  08/15/17 08/15/17 08/15/17 Range/Units





  19:10 19:35 19:35 


 


WBC   8.7   (5.0-10.0)  10^3/uL


 


RBC   3.73 L   (4.6-6.2)  10^6/uL


 


Hgb   10.4 L   (14.0-18.0)  g/dL


 


Hct   32.9 L   (40.0-54.0)  %


 


MCV   88.2   ()  fL


 


MCH   27.9   (27.0-34.0)  pg


 


MCHC   31.6 L   (33.0-35.0)  g/dL


 


Plt Count   428   (150-450)  10^3/uL


 


Neut % (Auto)   77.2 H   (42.2-75.2)  %


 


Lymph % (Auto)   12.0 L   (20.5-50.1)  %


 


Mono % (Auto)   9.5 H   (2-8)  %


 


Eos % (Auto)   1.1   (1.0-3.0)  %


 


Baso % (Auto)   0.2   (0.0-1.0)  %


 


Sodium    134 L  (135-145)  mmol/L


 


Potassium    3.5 L  (3.6-5.0)  mmol/L


 


Chloride    98 L  (101-111)  mmol/L


 


Carbon Dioxide    28.0  (21.0-31.0)  mmol/L


 


Anion Gap    11.5  


 


BUN    12  (7-18)  mg/dL


 


Creatinine    0.9  (0.6-1.3)  mg/dL


 


Est Cr Clr Drug Dosing    67.59  mL/min


 


Estimated GFR (MDRD)    > 60  


 


BUN/Creatinine Ratio    13.33  


 


Glucose    128 H  ()  mg/dL


 


Lactic Acid     (0.5-2.2)  mmol/L


 


Calcium    8.5  (8.4-10.2)  mg/dl


 


Total Bilirubin    0.6  (0.2-1.0)  mg/dL


 


AST    14  (10-42)  IU/L


 


ALT    13  (10-60)  IU/L


 


Alkaline Phosphatase    133 H  ()  IU/L


 


Total Protein    6.8  (6.7-8.2)  g/dl


 


Albumin    2.5 L  (3.2-5.5)  g/dl


 


Globulin    4.3  


 


Albumin/Globulin Ratio    0.58  


 


Urine Color  Yellow    (YELLOW)  


 


Urine Appearance  Cloudy    (CLEAR)  


 


Urine pH  8.5    (5.0-9.0)  


 


Ur Specific Gravity  1.015    (1.005-1.030)  


 


Urine Protein  100 H    (NEGATIVE)  


 


Urine Glucose (UA)  Negative    (NEGATIVE)  


 


Urine Ketones  Negative    (NEGATIVE)  


 


Urine Occult Blood  Large H    (NEGATIVE)  


 


Urine Nitrite  Negative    (NEGATIVE)  


 


Urine Bilirubin  Negative    (NEGATIVE)  


 


Urine Urobilinogen  1.0    (0.2-1.0)  mg/dL


 


Ur Leukocyte Esterase  Large H    (NEGATIVE)  


 


Urine RBC  >100 H    /HPF


 


Urine WBC  >100 H    (0-5/HPF)  /HPF


 


Ur Epithelial Cells  Rare    /HPF


 


Urine Bacteria  Moderate H    (0-FEW/HPF)  /HPF














  08/15/17 Range/Units





  19:35 


 


WBC   (5.0-10.0)  10^3/uL


 


RBC   (4.6-6.2)  10^6/uL


 


Hgb   (14.0-18.0)  g/dL


 


Hct   (40.0-54.0)  %


 


MCV   ()  fL


 


MCH   (27.0-34.0)  pg


 


MCHC   (33.0-35.0)  g/dL


 


Plt Count   (150-450)  10^3/uL


 


Neut % (Auto)   (42.2-75.2)  %


 


Lymph % (Auto)   (20.5-50.1)  %


 


Mono % (Auto)   (2-8)  %


 


Eos % (Auto)   (1.0-3.0)  %


 


Baso % (Auto)   (0.0-1.0)  %


 


Sodium   (135-145)  mmol/L


 


Potassium   (3.6-5.0)  mmol/L


 


Chloride   (101-111)  mmol/L


 


Carbon Dioxide   (21.0-31.0)  mmol/L


 


Anion Gap   


 


BUN   (7-18)  mg/dL


 


Creatinine   (0.6-1.3)  mg/dL


 


Est Cr Clr Drug Dosing   mL/min


 


Estimated GFR (MDRD)   


 


BUN/Creatinine Ratio   


 


Glucose   ()  mg/dL


 


Lactic Acid  0.8  (0.5-2.2)  mmol/L


 


Calcium   (8.4-10.2)  mg/dl


 


Total Bilirubin   (0.2-1.0)  mg/dL


 


AST   (10-42)  IU/L


 


ALT   (10-60)  IU/L


 


Alkaline Phosphatase   ()  IU/L


 


Total Protein   (6.7-8.2)  g/dl


 


Albumin   (3.2-5.5)  g/dl


 


Globulin   


 


Albumin/Globulin Ratio   


 


Urine Color   (YELLOW)  


 


Urine Appearance   (CLEAR)  


 


Urine pH   (5.0-9.0)  


 


Ur Specific Gravity   (1.005-1.030)  


 


Urine Protein   (NEGATIVE)  


 


Urine Glucose (UA)   (NEGATIVE)  


 


Urine Ketones   (NEGATIVE)  


 


Urine Occult Blood   (NEGATIVE)  


 


Urine Nitrite   (NEGATIVE)  


 


Urine Bilirubin   (NEGATIVE)  


 


Urine Urobilinogen   (0.2-1.0)  mg/dL


 


Ur Leukocyte Esterase   (NEGATIVE)  


 


Urine RBC   /HPF


 


Urine WBC   (0-5/HPF)  /HPF


 


Ur Epithelial Cells   /HPF


 


Urine Bacteria   (0-FEW/HPF)  /HPF











Meds: 


Medications














Discontinued Medications














Generic Name Dose Route Start Last Admin





  Trade Name Freq  PRN Reason Stop Dose Admin


 


Ciprofloxacin  Confirm  08/15/17 21:45  





  Ciprofloxacin Hcl  Administered  08/15/17 21:46  





  Dose   





  500 mg   





  .ROUTE   





  .STK-MED ONE   


 


Oxycodone HCl  Confirm  08/15/17 21:36  





  Oxycodone  Administered  08/15/17 21:37  





  Dose   





  10 mg   





  .ROUTE   





  .STK-MED ONE   


 


Oxycodone HCl  Confirm  08/15/17 21:39  





  Oxycontin  Administered  08/15/17 21:40  





  Dose   





  40 mg   





  .ROUTE   





  .STK-MED ONE   














- Radiology Interpretation


Free Text/Narrative:: 





Flat and Upright Abdomen  negative





- Re-Assessments/Exams


Free Text/Narrative Re-Assessment/Exam: 





17 01:58


Family calming, patient calmer. Cooperative. Increasing sundowning through ED 

stay.  Usually redirectable by daughter.  Options discussed with family. 

Nothing to indicate need for acute hospitalization. Family and patient unhappy 

regarding NH care. Awaiting determination on bed in [place]. Family prefer not 

to return to NH but patient unable to make up stairs to her home or his 

previous. Questioned hotel as option. Contact with NH re patient and if any 

paperwork would need to be signed by daughter. Informed daughter that paper 

work would need to be completed . 


Prior to discharge, Daughter feeling return to NH would be likely for tonight 

and while awaiting alternative placement. Reviewed medications changes with 

daughter, with scheduled 4 times daily and additional for breakthrough pain.  

Discussed advantage of longer acting fentanyl patch. Will defer reordering that 

to PCP.  





17 03:59








Departure





- Departure


Time of Disposition: 21:37


Disposition: DC/Tfer to [facility]


Condition: Fair


Clinical Impression: 


Chronic pain


Qualifiers:


 Chronic pain type: chronic pain syndrome Qualified Code(s): G89.4 - Chronic 

pain syndrome





Dementia


Qualifiers:


 Dementia type: unspecified type Dementia behavioral disturbance: with 

behavioral disturbance Qualified Code(s): F03.91 - Unspecified dementia with 

behavioral disturbance





Urinary tract infection


Qualifiers:


 Urinary tract infection type: site unspecified Hematuria presence: with 

hematuria Qualified Code(s): N39.0 - Urinary tract infection, site not specified








- Discharge Information


Instructions:  Pain Medicine Instructions, Easy-to-Read


Referrals: 


Alli Shahid MD [Primary Care Provider] - 


Forms:  ED Department Discharge


Additional Instructions: 


follow up in clinic with primary care on Friday


Oncology appointment tomorrow as scheduled, discuss pain medication alternatives


cipro 500mg one twice daily for 5 days


increase Oxycodone 20mg to 4 times daily


Breakthrough pain 5mg IR every 4 hours as needed








- My Orders


Last 24 Hours: 


My Active Orders





08/15/17 19:10


CULTURE URINE [RM] Stat 





08/15/17 19:23


Blood Culture x2 Reflex Set [OM.PC] Stat 





08/15/17 19:35


CULTURE BLOOD [BC] Stat 





08/15/17 19:40


CULTURE BLOOD [BC] Stat 














- Assessment/Plan


Last 24 Hours: 


My Active Orders





08/15/17 19:10


CULTURE URINE [RM] Stat 





08/15/17 19:23


Blood Culture x2 Reflex Set [OM.PC] Stat 





08/15/17 19:35


CULTURE BLOOD [BC] Stat 





08/15/17 19:40


CULTURE BLOOD [BC] Stat

## 2018-04-27 ENCOUNTER — HOSPITAL ENCOUNTER (EMERGENCY)
Dept: HOSPITAL 43 - DL.ED | Age: 83
Discharge: HOME | End: 2018-04-27
Payer: MEDICARE

## 2018-04-27 VITALS — DIASTOLIC BLOOD PRESSURE: 75 MMHG | SYSTOLIC BLOOD PRESSURE: 132 MMHG

## 2018-04-27 DIAGNOSIS — Z87.891: ICD-10-CM

## 2018-04-27 DIAGNOSIS — S39.011A: Primary | ICD-10-CM
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
## 2018-04-27 DIAGNOSIS — Z79.899: ICD-10-CM

## 2018-04-27 DIAGNOSIS — Z88.8: ICD-10-CM

## 2018-04-27 DIAGNOSIS — Z88.5: ICD-10-CM

## 2018-04-27 NOTE — EDM.PDOC
ED HPI GENERAL MEDICAL PROBLEM





- General


Chief Complaint: General


Stated Complaint: [place] IN HIS GROIN


Time Seen by Provider: 18 16:45


Source of Information: Reports: Patient, Family, RN, RN Notes Reviewed


History Limitations: Reports: No Limitations





- History of Present Illness


INITIAL COMMENTS - FREE TEXT/NARRATIVE: 


Pt presents to the ER with c/o continued pain to the left groin area as well as 

generalized pain elsewhere. He states he fell today at the [place] 

and has pain the right arm/wrist. He states he hit his head, but did not get 

knocked out. 


Onset: Gradual


  ** Left Groin


Pain Score (Numeric/FACES): 5





- Related Data


 Allergies











Allergy/AdvReac Type Severity Reaction Status Date / Time


 


lorazepam [From Ativan] Allergy  Confusion Verified 18 15:01


 


morphine Allergy  Confusion Verified 18 15:01


 


nitrofurantoin Allergy  Other Verified 18 15:01





[From Macrobid]     


 


oxybutynin Allergy  Cannot Verified 18 15:01





   Remember  


 


phenazopyridine Allergy  Other Verified 18 15:01





[From Pyridium]     











Home Meds: 


 Home Meds





Omeprazole [Prilosec] 20 mg PO DAILY 14 [History]


Tamsulosin [Flomax] 0.4 mg PO BEDTIME 14 [History]


Bicalutamide [Casodex] 1 tab PO DAILY 16 [History]


Magnesium Hydroxide [Milk of Magnesia] 30 ml PO DAILY PRN 08/15/17 [History]


Acetaminophen [Tylenol] 650 mg PO Q8H 18 [History]


Ascorbate Calcium [Vitamin C] 500 mg PO DAILY 18 [History]


DULoxetine [Cymbalta] 60 mg PO DAILY 18 [History]


Dexamethasone 1 mg PO BIDMEALS 18 [History]


Donepezil HCl [Aricept] 10 mg PO DAILY 18 [History]


Gabapentin [Neurontin] 100 mg PO TID 18 [History]


L.acidoph,Paracasei, B.lactis [Probiotic] 1 each PO DAILY 18 [History]


Melatonin 6 mg PO DAILY 18 [History]


Polyethylene Glycol 3350 [MiraLAX] 17 gm PO BID 18 [History]


busPIRone [Buspar] 20 mg PO TID 18 [History]


Docusate Sodium/Sennosides [Senna Plus] 1 tab PO BID 18 [History]


oxyCODONE 5 mg PO Q8H 18 [History]











Past Medical History


HEENT History: Reports: Cataract, Impaired Vision, Other (See Below)


Other HEENT History: wears glasses


Cardiovascular History: Reports: High Cholesterol


Respiratory History: Reports: None


Gastrointestinal History: Reports: Chronic Constipation, GERD


Genitourinary History: Reports: BPH, UTI, Recurrent, Other (See Below)


Other Genitourinary History: dilalation of urethra


Musculoskeletal History: Reports: Back Pain, Chronic, Fracture


Other Musculoskeletal History: left femur surgery


Neurological History: Reports: None


Psychiatric History: Reports: Depression


Endocrine/Metabolic History: Reports: None


Hematologic History: Reports: None


Immunologic History: Reports: None


Oncologic (Cancer) History: Reports: Prostate


Dermatologic History: Reports: None





- Infectious Disease History


Infectious Disease History: Reports: Chicken Pox, Measles, Mumps, Other (See 

Below)





- Past Surgical History


Head Surgeries/Procedures: Reports: None


HEENT Surgical History: Reports: None, Cataract Surgery


Cardiovascular Surgical History: Reports: None


GI Surgical History: Reports: Colonoscopy


Other Male  Surgeries/Procedures: prostate problems


Musculoskeletal Surgical History: Reports: Other (See Below)


Other Musculoskeletal Surgeries/Procedures:: back surg, R) hip joint replacement





Social & Family History





- Family History


Family Medical History: Noncontributory


Other Hematologic Family History: mother  of cerebral aneurysm





- Tobacco Use


Smoking Status *Q: Former Smoker


Years of Tobacco use: 20


Packs/Tins Daily: 0.5


Used Tobacco, but Quit: No


Month/Year Tobacco Last Used: when he was 41 years old


Second Hand Smoke Exposure: No





- Caffeine Use


Caffeine Use: Reports: Coffee, Soda





- Alcohol Use


Days Per Week of Alcohol Use: 0





- Recreational Drug Use


Recreational Drug Use: No





- Living Situation & Occupation


Occupation: Retired





ED ROS GENERAL





- Review of Systems


Review Of Systems: ROS reveals no pertinent complaints other than HPI.





ED EXAM, GENERAL





- Physical Exam


Exam: See Below


Exam Limited By: No Limitations


General Appearance: Alert, WD/WN, Mild Distress


Eye Exam: Bilateral Eye: EOMI


Ears: Normal External Exam, Hearing Grossly Normal


Nose: Normal Inspection


Throat/Mouth: Normal Inspection, Normal Voice, No Airway Compromise


Head: Atraumatic


Neck: Normal Inspection


Respiratory/Chest: No Respiratory Distress, Lungs Clear, Normal Breath Sounds, 

No Accessory Muscle Use


Cardiovascular: Normal Peripheral Pulses, Regular Rate, Rhythm, No Edema, No 

Gallop, No JVD, No Murmur, No Rub


Peripheral Pulses: 1+: Dorsalis Pedis (L), Dorsalis Pedis (R), 2+: Radial (L), 

Radial (R)


GI/Abdominal: Normal Bowel Sounds, Soft, Non-Tender


 (Male) Exam: Scrotum Tenderness (L), Testicular Tenderness (L).  No: Scrotal 

Swelling, Testicular Mass


Rectal (Males) Exam: Deferred


Back Exam: Normal Inspection, Decreased Range of Motion


Extremities: Normal Inspection, Limited Range of Motion


Neurological: Alert, Oriented


Psychiatric: Normal Affect, Normal Mood


Skin Exam: Warm, Dry, Intact, Normal Color, No Rash


Lymphatic: No Adenopathy





Course





- Vital Signs


Last Recorded V/S: 


 Last Vital Signs











Temp  98.7 F   18 16:15


 


Pulse  95   18 16:15


 


Resp  18   18 16:15


 


BP  132/75   18 16:15


 


Pulse Ox  94 L  18 16:15














- Orders/Labs/Meds


Labs: 


 Laboratory Tests











  18 Range/Units





  17:40 


 


Urine Color  Yellow  (YELLOW)  


 


Urine Appearance  Cloudy  (CLEAR)  


 


Urine pH  6.0  (5.0-9.0)  


 


Ur Specific Gravity  1.015  (1.005-1.030)  


 


Urine Protein  30 H  (NEGATIVE)  


 


Urine Glucose (UA)  500 H  (NEGATIVE)  


 


Urine Ketones  Negative  (NEGATIVE)  


 


Urine Occult Blood  Small H  (NEGATIVE)  


 


Urine Nitrite  Negative  (NEGATIVE)  


 


Urine Bilirubin  Negative  (NEGATIVE)  


 


Urine Urobilinogen  0.2  (0.2-1.0)  mg/dL


 


Ur Leukocyte Esterase  Trace H  (NEGATIVE)  


 


Urine RBC  20-30 H  /HPF


 


Urine WBC  >100 H  (0-5/HPF)  /HPF


 


Ur Epithelial Cells  Few  /HPF


 


Amorphous Sediment  Few  (0/HPF)  /HPF


 


Urine Bacteria  Moderate H  (0-FEW/HPF)  /HPF


 


Urine Mucus  Rare  /LPF














- Radiology Interpretation


Free Text/Narrative:: 


Abdomen/Pelvis CT : Large amount of callus formation is noted at the pubic 

symphysis with a gas collection again noted within the midline. This was 

present on the prior study. Findings may be secondary to chronic infection.


Bilateral fat filled inguinal hernias are present. 





See rad report








Departure





- Departure


Time of Disposition: 18:58


Disposition: Home, Self-Care 01


Condition: Fair


Clinical Impression: 


 Muscle strain








- Discharge Information


Instructions:  Adductor Muscle Strain, Muscle Strain, Easy-to-Read


Referrals: 


Alli Shahid MD [Primary Care Provider] - 


Forms:  ED Department Discharge


Additional Instructions: 


Follow up with Ortho


Follow up with Dr. Fischer

## 2018-04-28 ENCOUNTER — HOSPITAL ENCOUNTER (INPATIENT)
Dept: HOSPITAL 43 - DL.ED | Age: 83
LOS: 2 days | Discharge: HOME | DRG: 689 | End: 2018-04-30
Attending: INTERNAL MEDICINE | Admitting: INTERNAL MEDICINE
Payer: MEDICARE

## 2018-04-28 DIAGNOSIS — F32.9: ICD-10-CM

## 2018-04-28 DIAGNOSIS — N40.0: ICD-10-CM

## 2018-04-28 DIAGNOSIS — G89.29: ICD-10-CM

## 2018-04-28 DIAGNOSIS — R73.9: ICD-10-CM

## 2018-04-28 DIAGNOSIS — Z91.14: ICD-10-CM

## 2018-04-28 DIAGNOSIS — M54.9: ICD-10-CM

## 2018-04-28 DIAGNOSIS — Z87.440: ICD-10-CM

## 2018-04-28 DIAGNOSIS — Z79.891: ICD-10-CM

## 2018-04-28 DIAGNOSIS — T38.0X5A: ICD-10-CM

## 2018-04-28 DIAGNOSIS — Z85.46: ICD-10-CM

## 2018-04-28 DIAGNOSIS — R07.89: ICD-10-CM

## 2018-04-28 DIAGNOSIS — Z96.641: ICD-10-CM

## 2018-04-28 DIAGNOSIS — K21.9: ICD-10-CM

## 2018-04-28 DIAGNOSIS — Z88.6: ICD-10-CM

## 2018-04-28 DIAGNOSIS — R10.30: ICD-10-CM

## 2018-04-28 DIAGNOSIS — N39.0: Primary | ICD-10-CM

## 2018-04-28 DIAGNOSIS — E86.0: ICD-10-CM

## 2018-04-28 DIAGNOSIS — M25.531: ICD-10-CM

## 2018-04-28 DIAGNOSIS — K59.09: ICD-10-CM

## 2018-04-28 DIAGNOSIS — B96.89: ICD-10-CM

## 2018-04-28 DIAGNOSIS — Z28.21: ICD-10-CM
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
## 2018-04-28 DIAGNOSIS — E78.00: ICD-10-CM

## 2018-04-28 DIAGNOSIS — Z87.891: ICD-10-CM

## 2018-04-28 DIAGNOSIS — Z92.3: ICD-10-CM

## 2018-04-28 DIAGNOSIS — H54.7: ICD-10-CM

## 2018-04-28 DIAGNOSIS — Z79.899: ICD-10-CM

## 2018-04-28 DIAGNOSIS — E87.1: ICD-10-CM

## 2018-04-28 DIAGNOSIS — D63.8: ICD-10-CM

## 2018-04-28 DIAGNOSIS — Z88.8: ICD-10-CM

## 2018-04-28 DIAGNOSIS — Z88.1: ICD-10-CM

## 2018-04-28 DIAGNOSIS — G93.40: ICD-10-CM

## 2018-04-28 LAB
ANION GAP SERPL CALC-SCNC: 13 MMOL/L
CHLORIDE SERPL-SCNC: 93 MMOL/L (ref 101–111)
SODIUM SERPL-SCNC: 129 MMOL/L (ref 135–145)

## 2018-04-28 PROCEDURE — 87086 URINE CULTURE/COLONY COUNT: CPT

## 2018-04-28 PROCEDURE — 82962 GLUCOSE BLOOD TEST: CPT

## 2018-04-28 PROCEDURE — 87040 BLOOD CULTURE FOR BACTERIA: CPT

## 2018-04-28 PROCEDURE — 83036 HEMOGLOBIN GLYCOSYLATED A1C: CPT

## 2018-04-28 PROCEDURE — 73110 X-RAY EXAM OF WRIST: CPT

## 2018-04-28 PROCEDURE — 99284 EMERGENCY DEPT VISIT MOD MDM: CPT

## 2018-04-28 PROCEDURE — 86140 C-REACTIVE PROTEIN: CPT

## 2018-04-28 PROCEDURE — 85025 COMPLETE CBC W/AUTO DIFF WBC: CPT

## 2018-04-28 PROCEDURE — 81001 URINALYSIS AUTO W/SCOPE: CPT

## 2018-04-28 PROCEDURE — 83605 ASSAY OF LACTIC ACID: CPT

## 2018-04-28 PROCEDURE — 83735 ASSAY OF MAGNESIUM: CPT

## 2018-04-28 PROCEDURE — 36415 COLL VENOUS BLD VENIPUNCTURE: CPT

## 2018-04-28 PROCEDURE — 84100 ASSAY OF PHOSPHORUS: CPT

## 2018-04-28 PROCEDURE — 80053 COMPREHEN METABOLIC PANEL: CPT

## 2018-04-28 PROCEDURE — 87186 SC STD MICRODIL/AGAR DIL: CPT

## 2018-04-28 PROCEDURE — 87088 URINE BACTERIA CULTURE: CPT

## 2018-04-28 PROCEDURE — 71046 X-RAY EXAM CHEST 2 VIEWS: CPT

## 2018-04-28 PROCEDURE — 85651 RBC SED RATE NONAUTOMATED: CPT

## 2018-04-28 RX ADMIN — INSULIN DETEMIR SCH UNIT: 100 INJECTION, SOLUTION SUBCUTANEOUS at 22:24

## 2018-04-28 RX ADMIN — Medication SCH MG: at 22:29

## 2018-04-28 RX ADMIN — INSULIN DETEMIR SCH UNITS: 100 INJECTION, SOLUTION SUBCUTANEOUS at 21:40

## 2018-04-28 RX ADMIN — INSULIN DETEMIR SCH: 100 INJECTION, SOLUTION SUBCUTANEOUS at 21:55

## 2018-04-28 NOTE — EDM.PDOC
ED HPI GENERAL MEDICAL PROBLEM





- General


Chief Complaint: Lower Extremity Injury/Pain


Stated Complaint: fall [id number]


Time Seen by Provider: 18 09:57


Source of Information: Reports: Patient, Family, RN, RN Notes Reviewed


History Limitations: Reports: Other (some confusion from patient)





- History of Present Illness


INITIAL COMMENTS - FREE TEXT/NARRATIVE: 


Pt presents to the ER with his daughter. Pt states he was seen in the ER last 

evening for left groin pain. He states this morning he had a fall at the 

assisted living where he lives. He states he did hit his head but did not lose 

consciousness. Patient states he stuck his right arm out to catch himself and 

has pain in the right wrist. He also c/o pain with breathing and coughing in 

the right anterior chest. 


Patient denies N/V/D, fever or chills. 


Pt daughter states that his PCP and the assistive living have been decreasing 

his pain medication after a pelvic fracture in May of 2017. 


Onset: Gradual


Duration: Waxing/Waning


Location: Reports: Other (left groin)


Improves with: Reports: None


Worsens with: Reports: None


  ** Left Groin


Pain Score (Numeric/FACES): 8





  ** Right Chest


Pain Score (Numeric/FACES): 6





- Related Data


 Allergies











Allergy/AdvReac Type Severity Reaction Status Date / Time


 


lorazepam [From Ativan] Allergy  Confusion Verified 18 09:57


 


morphine Allergy  Confusion Verified 18 09:57


 


nitrofurantoin Allergy  Other Verified 18 09:57





[From Macrobid]     


 


oxybutynin Allergy  Cannot Verified 18 09:57





   Remember  


 


phenazopyridine Allergy  Other Verified 18 09:57





[From Pyridium]     











Home Meds: 


 Home Meds





Omeprazole [Prilosec] 20 mg PO DAILY 14 [History]


Tamsulosin [Flomax] 0.4 mg PO BEDTIME 14 [History]


Bicalutamide [Casodex] 1 tab PO DAILY 16 [History]


Magnesium Hydroxide [Milk of Magnesia] 30 ml PO DAILY PRN 08/15/17 [History]


Acetaminophen [Tylenol] 650 mg PO Q8H 18 [History]


Ascorbate Calcium [Vitamin C] 500 mg PO DAILY 18 [History]


DULoxetine [Cymbalta] 90 mg PO DAILY 18 [History]


Dexamethasone 1 mg PO BIDMEALS 18 [History]


Donepezil HCl [Aricept] 10 mg PO DAILY 18 [History]


Gabapentin [Neurontin] 100 mg PO TID 18 [History]


L.acidoph,Paracasei, B.lactis [Probiotic] 1 each PO DAILY 18 [History]


Melatonin 6 mg PO DAILY 18 [History]


Polyethylene Glycol 3350 [MiraLAX] 17 gm PO BID 18 [History]


busPIRone [Buspar] 20 mg PO TID 18 [History]


Docusate Sodium/Sennosides [Senna Plus] 1 tab PO BID 18 [History]


oxyCODONE 5 mg PO Q8H 18 [History]











Past Medical History


HEENT History: Reports: Cataract, Impaired Vision, Other (See Below)


Other HEENT History: wears glasses


Cardiovascular History: Reports: High Cholesterol


Respiratory History: Reports: None


Gastrointestinal History: Reports: Chronic Constipation, GERD


Genitourinary History: Reports: BPH, UTI, Recurrent, Other (See Below)


Other Genitourinary History: dilalation of urethra


Musculoskeletal History: Reports: Back Pain, Chronic, Fracture


Other Musculoskeletal History: left femur surgery


Neurological History: Reports: None


Psychiatric History: Reports: Depression


Endocrine/Metabolic History: Reports: None


Hematologic History: Reports: None


Immunologic History: Reports: None


Oncologic (Cancer) History: Reports: Prostate


Dermatologic History: Reports: None





- Infectious Disease History


Infectious Disease History: Reports: Chicken Pox, Measles, Mumps, Other (See 

Below)





- Past Surgical History


Head Surgeries/Procedures: Reports: None


HEENT Surgical History: Reports: None, Cataract Surgery


Cardiovascular Surgical History: Reports: None


GI Surgical History: Reports: Colonoscopy


Other Male  Surgeries/Procedures: prostate problems


Musculoskeletal Surgical History: Reports: Other (See Below)


Other Musculoskeletal Surgeries/Procedures:: back surg, R) hip joint replacement





Social & Family History





- Family History


Family Medical History: Noncontributory


Other Hematologic Family History: mother  of cerebral aneurysm





- Tobacco Use


Smoking Status *Q: Former Smoker


Years of Tobacco use: 20


Packs/Tins Daily: 0.5


Used Tobacco, but Quit: No


Month/Year Tobacco Last Used: when he was 41 years old


Second Hand Smoke Exposure: No





- Caffeine Use


Caffeine Use: Reports: Coffee, Soda, Tea





- Alcohol Use


Days Per Week of Alcohol Use: 0





- Recreational Drug Use


Recreational Drug Use: No





- Living Situation & Occupation


Occupation: Retired





Review of Systems





- Review of Systems


Review Of Systems: ROS reveals no pertinent complaints other than HPI.





ED EXAM, GENERAL





- Physical Exam


Exam: See Below


Exam Limited By: No Limitations


General Appearance: Alert, WD/WN, No Apparent Distress


Eye Exam: Bilateral Eye: EOMI, Normal Inspection


Ears: Normal External Exam, Hearing Grossly Normal


Nose: Normal Inspection


Throat/Mouth: Normal Inspection, Normal Voice, No Airway Compromise


Head: Atraumatic, Normocephalic, Other (No evidence of swelling or ecchymosis 

on head)


Neck: Normal Inspection, Supple, Non-Tender, Full Range of Motion


Respiratory/Chest: No Respiratory Distress, Lungs Clear, Normal Breath Sounds, 

No Accessory Muscle Use, Other (R Anterior chest wall tenderness.)


Cardiovascular: Normal Peripheral Pulses, Regular Rate, Rhythm, No Edema, No 

Gallop, No JVD, No Murmur, No Rub


Peripheral Pulses: 2+: Radial (L), Radial (R)


GI/Abdominal: Normal Bowel Sounds, Soft, Non-Tender, No Organomegaly, No 

Distention, No Abnormal Bruit, No Mass


 (Male) Exam: Deferred


Rectal (Males) Exam: Deferred


Back Exam: Normal Inspection, Decreased Range of Motion


Extremities: Normal Inspection


Neurological: Alert, Oriented, Normal Cognition, Normal Reflexes, No Motor/

Sensory Deficits


Psychiatric: Normal Affect, Normal Mood


Skin Exam: Warm, Dry, Intact, Normal Color, No Rash


Lymphatic: No Adenopathy





Course





- Vital Signs


Last Recorded V/S: 


 Last Vital Signs











Temp  97.2 F   18 09:40


 


Pulse  102 H  18 09:40


 


Resp  16   18 09:40


 


BP  135/72   18 09:40


 


Pulse Ox  93 L  18 09:40














- Orders/Labs/Meds


Orders: 


 Active Orders 24 hr











 Category Date Time Status


 


 Blood Glucose Check, Bedside [RC] ONETIME Care  18 11:14 Active


 


 CULTURE BLOOD [BC] Stat Lab  18 11:49 Ordered


 


 CULTURE BLOOD [BC] Stat Lab  18 11:49 Ordered


 


 CULTURE URINE [RM] Stat Lab  18 11:48 Ordered


 


 LACTIC ACID [CHEM] Stat Lab  18 11:47 Ordered


 


 UA W/MICROSCOPIC [URIN] Stat Lab  18 10:25 Ordered


 


 Blood Culture x2 Reflex Set [OM.PC] Stat Oth  18 11:46 Ordered











Labs: 


 Laboratory Tests











  18 Range/Units





  10:18 10:18 10:25 


 


WBC  7.7    (5.0-10.0)  10^3/uL


 


RBC  4.13 L    (4.6-6.2)  10^6/uL


 


Hgb  12.2 L D    (14.0-18.0)  g/dL


 


Hct  37.5 L    (40.0-54.0)  %


 


MCV  90.8    ()  fL


 


MCH  29.5    (27.0-34.0)  pg


 


MCHC  32.5 L    (33.0-35.0)  g/dL


 


Plt Count  184  D    (150-450)  10^3/uL


 


Neut % (Auto)  80.0 H    (42.2-75.2)  %


 


Lymph % (Auto)  11.3 L    (20.5-50.1)  %


 


Mono % (Auto)  8.3 H    (2-8)  %


 


Eos % (Auto)  0.3 L    (1.0-3.0)  %


 


Baso % (Auto)  0.1    (0.0-1.0)  %


 


Sodium   129 L   (135-145)  mmol/L


 


Potassium   4.0   (3.6-5.0)  mmol/L


 


Chloride   93 L   (101-111)  mmol/L


 


Carbon Dioxide   27.0   (21.0-31.0)  mmol/L


 


Anion Gap   13.0   


 


BUN   21 H   (7-18)  mg/dL


 


Creatinine   0.9   (0.6-1.3)  mg/dL


 


Est Cr Clr Drug Dosing   62.28   mL/min


 


Estimated GFR (MDRD)   > 60   


 


BUN/Creatinine Ratio   23.33   


 


Glucose   501 H*   ()  mg/dL


 


POC Glucose     ()  mg/dl


 


Calcium   8.7   (8.4-10.2)  mg/dl


 


Total Bilirubin   0.7   (0.2-1.0)  mg/dL


 


AST   26   (10-42)  IU/L


 


ALT   24   (10-60)  IU/L


 


Alkaline Phosphatase   72   ()  IU/L


 


Total Protein   6.6 L   (6.7-8.2)  g/dl


 


Albumin   2.7 L   (3.2-5.5)  g/dl


 


Globulin   3.9   


 


Albumin/Globulin Ratio   0.69   


 


Urine Color    Yellow  (YELLOW)  


 


Urine Appearance    Cloudy  (CLEAR)  


 


Urine pH    6.5  (5.0-9.0)  


 


Ur Specific Gravity    1.015  (1.005-1.030)  


 


Urine Protein    30 H  (NEGATIVE)  


 


Urine Glucose (UA)    500 H  (NEGATIVE)  


 


Urine Ketones    Negative  (NEGATIVE)  


 


Urine Occult Blood    Small H  (NEGATIVE)  


 


Urine Nitrite    Positive H  (NEGATIVE)  


 


Urine Bilirubin    Negative  (NEGATIVE)  


 


Urine Urobilinogen    0.2  (0.2-1.0)  mg/dL


 


Ur Leukocyte Esterase    Trace H  (NEGATIVE)  


 


Urine RBC    0-5  /HPF


 


Urine WBC    >100 H  (0-5/HPF)  /HPF


 


Ur Epithelial Cells    Few  /HPF


 


Urine Bacteria    Many H  (0-FEW/HPF)  /HPF














  18 Range/Units





  11:16 


 


WBC   (5.0-10.0)  10^3/uL


 


RBC   (4.6-6.2)  10^6/uL


 


Hgb   (14.0-18.0)  g/dL


 


Hct   (40.0-54.0)  %


 


MCV   ()  fL


 


MCH   (27.0-34.0)  pg


 


MCHC   (33.0-35.0)  g/dL


 


Plt Count   (150-450)  10^3/uL


 


Neut % (Auto)   (42.2-75.2)  %


 


Lymph % (Auto)   (20.5-50.1)  %


 


Mono % (Auto)   (2-8)  %


 


Eos % (Auto)   (1.0-3.0)  %


 


Baso % (Auto)   (0.0-1.0)  %


 


Sodium   (135-145)  mmol/L


 


Potassium   (3.6-5.0)  mmol/L


 


Chloride   (101-111)  mmol/L


 


Carbon Dioxide   (21.0-31.0)  mmol/L


 


Anion Gap   


 


BUN   (7-18)  mg/dL


 


Creatinine   (0.6-1.3)  mg/dL


 


Est Cr Clr Drug Dosing   mL/min


 


Estimated GFR (MDRD)   


 


BUN/Creatinine Ratio   


 


Glucose   ()  mg/dL


 


POC Glucose  478 H*  ()  mg/dl


 


Calcium   (8.4-10.2)  mg/dl


 


Total Bilirubin   (0.2-1.0)  mg/dL


 


AST   (10-42)  IU/L


 


ALT   (10-60)  IU/L


 


Alkaline Phosphatase   ()  IU/L


 


Total Protein   (6.7-8.2)  g/dl


 


Albumin   (3.2-5.5)  g/dl


 


Globulin   


 


Albumin/Globulin Ratio   


 


Urine Color   (YELLOW)  


 


Urine Appearance   (CLEAR)  


 


Urine pH   (5.0-9.0)  


 


Ur Specific Gravity   (1.005-1.030)  


 


Urine Protein   (NEGATIVE)  


 


Urine Glucose (UA)   (NEGATIVE)  


 


Urine Ketones   (NEGATIVE)  


 


Urine Occult Blood   (NEGATIVE)  


 


Urine Nitrite   (NEGATIVE)  


 


Urine Bilirubin   (NEGATIVE)  


 


Urine Urobilinogen   (0.2-1.0)  mg/dL


 


Ur Leukocyte Esterase   (NEGATIVE)  


 


Urine RBC   /HPF


 


Urine WBC   (0-5/HPF)  /HPF


 


Ur Epithelial Cells   /HPF


 


Urine Bacteria   (0-FEW/HPF)  /HPF











Meds: 


Medications














Discontinued Medications














Generic Name Dose Route Start Last Admin





  Trade Name Freq  PRN Reason Stop Dose Admin


 


Oxycodone HCl  10 mg  18 10:21  18 10:36





  Oxycontin  PO  18 10:22  10 mg





  ONETIME ONE   Administration





     





     





     





     














- Radiology Interpretation


Free Text/Narrative:: 


Chest xray:


No acute findings


Right wrist xray:


No acute fracture





See rad report








Departure





- Departure


Time of Disposition: 11:49


Disposition: Refer to Observation


Condition: Fair


Clinical Impression: 


 Hyperglycemia, Left groin pain





UTI (urinary tract infection)


Qualifiers:


 Urinary tract infection type: site unspecified Hematuria presence: with 

hematuria Qualified Code(s): N39.0 - Urinary tract infection, site not specified








- Discharge Information


Forms:  ED Department Discharge





- My Orders


Last 24 Hours: 


My Active Orders





18 10:25


UA W/MICROSCOPIC [URIN] Stat 





18 11:14


Blood Glucose Check, Bedside [RC] ONETIME 





18 11:46


Blood Culture x2 Reflex Set [OM.PC] Stat 





18 11:47


LACTIC ACID [CHEM] Stat 





18 11:48


CULTURE URINE [RM] Stat 





18 11:49


CULTURE BLOOD [BC] Stat 


CULTURE BLOOD [BC] Stat 














- Assessment/Plan


Last 24 Hours: 


My Active Orders





18 10:25


UA W/MICROSCOPIC [URIN] Stat 





18 11:14


Blood Glucose Check, Bedside [RC] ONETIME 





18 11:46


Blood Culture x2 Reflex Set [OM.PC] Stat 





18 11:47


LACTIC ACID [CHEM] Stat 





18 11:48


CULTURE URINE [RM] Stat 





18 11:49


CULTURE BLOOD [BC] Stat 


CULTURE BLOOD [BC] Stat

## 2018-04-28 NOTE — HP
CHIEF COMPLAINT:  Increasing weakness, tiredness, and confusion.

 

HISTORY OF PRESENT ILLNESS:  Mr. Lei Norman is an 82-year-old male with

medical history significant for prostate cancer in the past, requiring radiation

treatments and radiation beads and Lupron treatment, and history of pelvic

fracture in the past, presented to the ER last night with complaints of having

pain to the left groin side and was discharged home.  After going home, the

patient had a fall in the bathroom last night and was brought back to the

emergency room; and further evaluation showed evidence of possible urinary tract

infection, hyponatremia, and dehydration, requiring admission to the hospital.

 

At this time, the patient complains of pain to the groin side which is mostly on

the left side; 3 to 4/10 in intensity; chronic in nature; aggravated on

exertion, ambulation, and sitting up; relieved with pain medication;

nonradiating type of pain; not associated with nausea or vomiting.  Denies any

fevers or chills in the last few days.  No complaints of chest pain.  No

complaints of shortness of breath.  No complaints of cough with sputum.  No

complaints of abdominal pain.  The patient claims that he had fallen in the past

and had a pelvic fracture, and since then, the patient had this ongoing pain

which is intermittent in nature.  He denies any diarrhea.  He denies any burning

micturition.

 

The patient denied any history of chest pains on exertion.  No history of

dyspnea on exertion.  No history of orthopnea or paroxysmal nocturnal dyspnea.

The patient denied any history of hematemesis, hematochezia, or melenic stools.

Normal bowel and bladder habits, otherwise.

 

REVIEW OF SYSTEMS:

A complete review of system including skin; ear, nose, and throat;

cardiovascular system; respiratory system; gastrointestinal system;

genitourinary system; hematology/oncology; neurology; allergy; immunology;

constitutional were all evaluated and were negative except for the above-said

notes.

 

PAST MEDICAL HISTORY:  Significant for prostate cancer, requiring radiation

treatment, radiation beads, and Lupron treatment.

 

PAST SURGICAL HISTORY:  Significant for:

1. Hip fracture repair.

2. Femur fracture repair.

3. Radiation treatment.

 

FAMILY HISTORY:  None significant as per the patient.

 

SOCIAL HISTORY:  The patient had history of smoking in the past, but quit

smoking many years back and quit drinking alcohol many years back.

 

ALLERGIC HISTORY:  The patient noted to have allergy to Ativan, morphine,

nitrofurantoin, oxybutynin, and Pyridium.

 

MEDICATIONS:  Home medications include:

1. Milk of magnesia 30 mL daily as needed.

2. Oxycodone 5 mg every 8 hours.

3. Tylenol 650 mg every 8 hours.

4. Neurontin 100 mg 3 times a day.

5. Docusate sodium 1 tablet twice a day.

6. Flomax 0.4 mg at bedtime.

7. Dexamethasone 1 mg twice daily.

8. Omeprazole 20 mg daily.

9. Aricept 10 mg daily.

10.Vitamin C 500 mg daily.

11.Melatonin 6 mg daily.

12.Cymbalta 90 mg daily.

13.BuSpar 20 mg 3 times a day.

 

PHYSICAL EXAMINATION:

Vital Signs:  Temperature of 97.2, pulse of 102, blood pressure 135/72,

respiratory rate of 16, and saturating at 93%.

General Appearance:  The patient is well oriented to time, place, and person.

Follows commands spontaneously.

Cardiovascular System:  S1 and S2 heard with normal intensity.  No gallops.

Respiratory System:  Clear to auscultation bilaterally.  No wheeze.  No

crepitations.

Abdomen:  Soft.  Bowel sounds positive.  Nontender.  No rigidity.  No guarding.

No rebound tenderness.

Extremities:  No edema in the bilateral lower extremities.

Groin:  The patient has normal testicles in place.  No hernia.  No erythema.  No

swelling.  No discharge noted on groin exam.

 

LABORATORY DATA:

1. WBC 7.7, hemoglobin 12.2, hematocrit 37.5, platelet count 184.

2. Sodium 129, potassium 4, chloride 93, bicarb 27, BUN 21, creatinine 0.9,

    glucose 501.

3. Lactic acid 1.5, calcium 8.7, total bilirubin 0.7, AST 26, ALT 24, alkaline

    phosphatase 72.

4. Urinalysis positive for nitrites, trace leukocyte esterase, wbc's greater

    than 100, many bacteria.

 

IMAGIN. X-ray of the right wrist, no acute fracture.

2. X-ray of the chest, no acute pulmonary disease.

3. CT scan of the pelvis shows large amount of callus formation noted at the

    pubic symphysis with a gas collection again noted within the midline.  This

    was present on the prior study.  Findings may be secondary to chronic

    infection and bilateral fat-filled inguinal hernia.  This is when compared

    to a CAT scan done in 2018.

 

ASSESSMENT:

1. Acute encephalopathy, improved.

2. Urinary tract infection.

3. Hyponatremia.

4. Hyperglycemia.

5. History of prostate cancer, requiring radiation treatment.

 

PLAN:

1. Urinary tract infection.  The patient presents with a UTI leading to acute

    encephalopathy.  The patient will be admitted to the hospital.  We will

    have him on IV antibiotic ceftriaxone.  We will obtain urine cultures,

    blood cultures, and titrate the antibiotics once we have the culture

    reports available.  Closely follow.

2. Hyponatremia.  This could be resulting from dehydration.  We will keep him

    hydrated with IV fluids.  Recheck a basic metabolic panel in the a.m.

3. Acute encephalopathy.  The patient was noted to be confused as per family

    members this morning.  The patient appears to be well oriented to time,

    place, and person.  We will closely follow.  The patient has underlying

    dementia, on Aricept.

4. Anemia.  This could be anemia of chronic disease.  The patient denied any

    hematemesis, hematochezia, or melenic stools.

5. Abnormal CT scan.  The patient had an abnormal CT scan of the pelvis, but

    there is a callus formation with gas filled in the midline.  This has been

    stable since February.  Less likely, we are dealing with any osteomyelitis

    as in the last 3 months, the patient would have progressed to worse if

    there is a true osteomyelitis, and moreover, the patient had radiation

    treatment and pelvic fractures in the past which could have resulted in the

    following changes.  We will closely follow the patient.  We will follow the

    blood cultures.  He might need Orthopedic evaluation as an outpatient when

    he seems more stable.

6. DVT prophylaxis.  We will have him on Lovenox for DVT prophylaxis.

7. Code status.  The patient wants to be full code.

8. Discussed with family members at bedside.  Discussed with Rosalie, ER

    physician, regarding the plan of care.  Reviewed the labs and medications.

    Reviewed the old charts.

 

DD:  2018 13:18:28

DT:  2018 14:00:27

[facility]

Job #:  [id number]

## 2018-04-29 LAB
ANION GAP SERPL CALC-SCNC: 11.7 MMOL/L
CHLORIDE SERPL-SCNC: 96 MMOL/L (ref 101–111)
SODIUM SERPL-SCNC: 134 MMOL/L (ref 135–145)

## 2018-04-29 RX ADMIN — INSULIN DETEMIR SCH UNIT: 100 INJECTION, SOLUTION SUBCUTANEOUS at 21:41

## 2018-04-29 RX ADMIN — Medication SCH TAB: at 09:15

## 2018-04-29 RX ADMIN — OMEPRAZOLE SCH MG: 20 CAPSULE, DELAYED RELEASE ORAL at 05:53

## 2018-04-29 RX ADMIN — Medication SCH MG: at 21:38

## 2018-04-29 RX ADMIN — Medication SCH EACH: at 09:16

## 2018-04-29 NOTE — PN
DATE:  04/29/2018

 

SUBJECTIVE:  Mr. Lei Norman is an 82-year-old male with medical history

significant for prostate cancer in the past requiring radiation treatment,

radiation beads, and Lupron treatment; history of pelvic fracture in the past;

admitted with having a fall and noted to have urinary tract infection at the

time of admission.

 

For the last 24 hours, the patient was noted to have elevated blood sugars.  We

tried to give insulin, but the patient declines to take any insulin.  He denies

any chest pain.  No shortness of breath.  No abdominal pain.  No nausea.  No

vomiting.  No diarrhea.  He keeps requesting to be sent home, but the patient

was explained about the seriousness of his infection.

 

REVIEW OF SYSTEMS:

Cardiovascular, respiratory, gastrointestinal, neurology, constitutional were

all evaluated.

 

PHYSICAL EXAMINATION:

Vital Signs:  Temperature of 96.8, pulse of 82, blood pressure 149/78,

respiratory rate of 20, and saturating at 96% on room air.

General Appearance:  The patient is well oriented to time, place, and person.

Follows commands spontaneously.

Cardiovascular System:  S1 and S2 heard with normal intensity.  No gallops.

Respiratory System:  Clear to auscultation bilaterally.  No wheeze.  No

crepitations.

Abdomen:  Bowel sounds positive.  Nontender.  No rigidity.  No guarding.  No

rebound tenderness.

Extremities:  No edema in the bilateral lower extremities.

Neurology:  No gross focal neurological deficit.

 

MEDICATIONS:  Reviewed.  Continue with ceftriaxone 1 g daily.  The patient is on

insulin, but the patient declines to take insulin.  Continue with omeprazole for

now.

 

LABORATORY DATA:  Labs reviewed.

1. WBC 7.8, hemoglobin 12, hematocrit 37, platelet count 204.

2. Sodium 134, potassium 3.7, chloride 96, bicarb 30, BUN 18, creatinine 0.7,

    glucose 251.

3. Urine culture is showing greater than 100,000 colony-forming units, gram-

    negative morris.

 

ASSESSMENT:

1. Urinary tract infection.

2. Hyperglycemia with possible underlying type 2 diabetes mellitus.

3. Acute encephalopathy, resolved.

4. Hyponatremia.

5. History of prostate cancer.

 

PLAN:

1. Urinary tract infection.  Urine culture is positive for gram-negative rods.

    He is currently on ceftriaxone.  He remains afebrile.  No leukocytosis

    noted at this time.  Continue with current IV antibiotics.  We will follow

    with the susceptibility and identification of the bacteria in the culture.

2. Acute encephalopathy, resolved.  The patient is not confused at this time.

3. Hyponatremia, improving.  The patient was noted to have sodium of 129 at

    the time of admission.  This has improved with IV normal saline.  Recheck a

    basic metabolic panel in the a.m.

4. Hyperglycemia.  The patient could have underlying borderline diabetes.  We

    ordered a hemoglobin A1c.  Await for the report.  The patient was explained

    about ill effects of hyperglycemia on his health and encouraged him to take

    insulin while in the hospital with underlying infection.  We will closely

    follow the patient.

5. Anemia.  This could be anemia of chronic disease.  The patient denies any

    hematemesis, hematochezia, or melenic stools.

6. Discussed with the family members at bedside.

 

DD:  04/29/2018 11:52:27

DT:  04/29/2018 12:34:41

[facility]

Job #:  [id number]

## 2018-04-30 VITALS — DIASTOLIC BLOOD PRESSURE: 74 MMHG | SYSTOLIC BLOOD PRESSURE: 139 MMHG

## 2018-04-30 RX ADMIN — Medication SCH: at 09:47

## 2018-04-30 RX ADMIN — OMEPRAZOLE SCH MG: 20 CAPSULE, DELAYED RELEASE ORAL at 05:00

## 2018-04-30 NOTE — DISCH
ADMITTING DIAGNOSES:

1. Urinary tract infection.

2. Acute encephalopathy.

3. Hyponatremia.

4. Hyperglycemia.

 

DISCHARGE DIAGNOSES:

1. Acute encephalopathy, resolved.

2. Urinary tract infection with Morganella morganii, sensitive to

    ciprofloxacin.

3. Hyponatremia, resolved.

4. Hyperglycemia, steroid induced.

5. History of prostate cancer.

 

HISTORY OF PRESENT ILLNESS:  Mr. Lei Norman is an 82-year-old male with a

medical history significant for prostate cancer in the past requiring radiation

treatment and radiation beads along with Lupron treatment, history of pelvic

fracture, admitted to the hospital with complaints of increasing weakness,

tiredness, and episodes of confusion and noticed by the family members.  On

admission, the patient was noted to have urinary tract infection.  The patient

was started on IV antibiotic with ceftriaxone.  He was noted to have

hyperglycemia.  We did order a hemoglobin A1c on this admission, the result of

which is still pending at the time of this dictation.  Continued to have

hyperglycemia, we prescribed insulin regimen for the patient, but the patient

declines to receive insulin treatment.  The patient was explained about

complications of diabetes and still then he believes that his blood sugars are

elevated secondary to his diet, and he is trying to change his diet.  He does

not want to be treated for hyperglycemia at this time.  The patient was given

glucometer when he is discharged home.  He is advised to check his blood sugars

at least 2 times a day and report back to his primary care physician.  He is

also noted to be on dexamethasone, exact reason not clear, could be from his

underlying cancer.  This will need to be followed with his primary care

physician for further followup.  He has been on dexamethasone since February, so

we could not discontinue the medications at this time.  He has an upcoming

appointment with Urology also, so the patient is advised to discuss this further

with his Urology and also primary care physician.  His urine culture was

positive for Morganella morganii, which is sensitive to ciprofloxacin, so we

prescribed oral ciprofloxacin at the time of discharge.  The patient wanted to

be discharged home.  He is discharged home in stable condition.  He is advised

to follow with his primary care physician in next 1-2 weeks of time.

 

DISCHARGE MEDICATIONS:  Include,

1. Tylenol 650 every 8 hours for pain.

2. Vitamin C 500 mg daily.

3. Casodex one tablet daily.

4. Ciprofloxacin 500 mg twice a day for next 5 days.

5. Cymbalta 60 mg daily.

6. Dexamethasone 1 mg twice a day.

7. Docusate sodium one tablet twice a day.

8. Aricept 10 mg daily.

9. Neurontin 100 mg 3 times a day.

10.Lactobacillus one tablet daily.

11.Milk of magnesia 30 mL daily as needed for constipation.

12.Melatonin 6 mg daily.

13.Omeprazole 20 mg daily.

14.MiraLax 17 g twice a day.

15.Flomax 0.4 mg at bedtime.

16.Buspar 20 mg 3 times a day.

17.Oxycodone 5 mg every 8 hours.

 

PHYSICAL EXAMINATION:

Vital Signs:  On the day of discharge vitals; temperature of 98.3, pulse of 94,

blood pressure of 143/75, respiratory rate of 18, saturating at 94% on room air.

General Appearance:  The patient is well oriented to time, place, and person.

Follows commands spontaneously.

Cardiovascular System:  S1, S2 heard with normal intensity.  No gallops.

Respiratory System:  Clear to auscultation bilaterally.  No wheeze.  No

crepitations.

Abdomen:  Soft.  Bowel sounds positive.  Nontender.  No rigidity.

Extremities:  No edema in bilateral lower extremities.

Neurology:  No gross focal neurological deficit.

 

CONDITION ON ADMISSION:  Poor.

 

CONDITION ON DISCHARGE:  Stable.

 

ACTIVITY:  As tolerated.

 

DIET:  Consistent carbohydrate diet.

 

FOLLOWUP:  Follow up with primary care physician in next 1-2 weeks of time and

follow up with Urology as scheduled.

 

TIME SPENT:  Spent over 35 minutes of time in evaluating and treating this

patient and performing discharge planning.

 

DD:  04/30/2018 11:17:53

DT:  04/30/2018 15:49:56

[facility]

Job #:  [id number]

## 2019-01-06 ENCOUNTER — HOSPITAL ENCOUNTER (EMERGENCY)
Dept: HOSPITAL 43 - DL.ED | Age: 84
Discharge: HOME | End: 2019-01-06
Payer: MEDICARE

## 2019-01-06 VITALS — DIASTOLIC BLOOD PRESSURE: 85 MMHG | SYSTOLIC BLOOD PRESSURE: 144 MMHG

## 2019-01-06 DIAGNOSIS — J06.9: Primary | ICD-10-CM

## 2019-01-06 DIAGNOSIS — E11.9: ICD-10-CM

## 2019-01-06 DIAGNOSIS — Z88.5: ICD-10-CM

## 2019-01-06 DIAGNOSIS — Z79.899: ICD-10-CM

## 2019-01-06 DIAGNOSIS — Z88.8: ICD-10-CM

## 2019-01-06 DIAGNOSIS — Z79.4: ICD-10-CM

## 2019-01-06 LAB
ANION GAP SERPL CALC-SCNC: 15 MMOL/L
CHLORIDE SERPL-SCNC: 101 MMOL/L (ref 101–111)
SODIUM SERPL-SCNC: 139 MMOL/L (ref 135–145)

## 2019-01-06 NOTE — EDM.PDOC
ED HPI GENERAL MEDICAL PROBLEM





- General


Chief Complaint: Respiratory Problem


Stated Complaint: WHEEZING


Time Seen by Provider: 19 21:15


Source of Information: Reports: Patient


History Limitations: Reports: No Limitations





- History of Present Illness


INITIAL COMMENTS - FREE TEXT/NARRATIVE: 





This 82 yo male patient reports to the ED with diffuse wheezing (reported by 

[facility] nurse) and shortness of breath (reported by patient). The 

patient reports he has had wheezing for the past week, but believes it is 

getting worse. The patient reports he has been getting IV antibiotics and IV 

antifungals for the past 7 weeks. The antibiotics were stopped 2 days ago. The 

patient reports his symptoms seem to get worse in the evening.  


Duration: Week(s):, Constant, Getting Worse


Location: Reports: Chest


Quality: Reports: Other


Improves with: Reports: None


Worsens with: Reports: None


Context: Reports: Other


Associated Symptoms: Reports: cough w sputum





- Related Data


 Allergies











Allergy/AdvReac Type Severity Reaction Status Date / Time


 


lorazepam [From Ativan] Allergy  Confusion Verified 18 09:06


 


morphine Allergy  Confusion Verified 18 09:06


 


nitrofurantoin Allergy  Other Verified 18 09:06





[From Macrobid]     


 


oxybutynin Allergy  Cannot Verified 18 09:06





   Remember  


 


phenazopyridine Allergy  Other Verified 18 09:06





[From Pyridium]     











Home Meds: 


 Home Meds





Tamsulosin [Flomax] 0.4 mg PO BEDTIME 14 [History]


Acetaminophen [Tylenol] 650 mg PO Q6H 18 [History]


Ascorbate Calcium [Vitamin C] 500 mg PO BID 18 [History]


Dexamethasone 0.5 mg PO DAILY 18 [History]


Donepezil HCl [Aricept] 10 mg PO DAILY 18 [History]


Gabapentin [Neurontin] 100 mg PO TID 18 [History]


L.acidoph,Paracasei, B.lactis [Probiotic] 1 each PO DAILY 18 [History]


busPIRone [Buspar] 20 mg PO TID 18 [History]


Alendronate Sodium [Fosamax] 70 mg PO WEEKLY 18 [History]


Bicalutamide [Casodex] 50 mg PO DAILY 18 [History]


Celecoxib [CeleBREX] 100 mg PO DAILY 18 [History]


Cholecalciferol (Vitamin D3) [Vitamin D3] 1,000 unit PO BID 18 [History]


Cranberry 500 mg PO BID 18 [History]


DULoxetine [Cymbalta] 90 mg PO DAILY 18 [History]


Dextran 70/Hypromellose [Artificial Tears] 1 drop EYEBOTH TID 18 [History]


Insulin Aspart [NovoLOG] 5 unit SQ WITHLUNCH 18 [History]


Insulin Aspart [NovoLOG] 8 unit SQ ACDINNER 18 [History]


Insulin Glarg,Human.Rec.Analog [Lantus] 28 units SQ BEDTIME 18 [History]


Lactulose 15 ml PO BID 18 [History]


Magnesium Hydroxide [Milk of Magnesia] 15 ml PO DAILY PRN 18 [History]


Melatonin 6 mg PO BEDTIME 18 [History]


Ondansetron [Zofran ODT] 8 mg PO Q8HR PRN 18 [History]


Pantoprazole Sodium [Protonix] 40 mg PO DAILY 18 [History]


Polyethylene Glycol 3350 [MiraLAX] 17 gm PO BID 18 [History]


Sennosides/Docusate Sodium [Senna Laxative Tablet] 2 each PO BID 18 [

History]


Trolamine Salicylate [Aspercreme] 1 applic TP ASDIRECTED 18 [History]


oxyCODONE HCl [Oxycodone HCl] 10 mg PO Q6HR 18 [History]


risperiDONE [Risperdal] 0.25 mg PO BEDTIME 18 [History]


Cranberry Conc/Ascorbic Acid [Cystex Cranberry] 1 tab PO BID 18 [History]











Past Medical History


HEENT History: Reports: Cataract, Impaired Vision, Other (See Below)


Other HEENT History: wears glasses


Cardiovascular History: Reports: High Cholesterol


Respiratory History: Reports: None


Gastrointestinal History: Reports: Chronic Constipation, GERD


Genitourinary History: Reports: BPH, Prostate Disorder, Retention, Urinary, UTI

, Recurrent, Other (See Below)


Other Genitourinary History: dilalation of urethra


Musculoskeletal History: Reports: Back Pain, Chronic, Fracture


Other Musculoskeletal History: left femur surgery


Neurological History: Reports: None


Psychiatric History: Reports: Alzheimers Disease, Anxiety, Dementia, Depression


Endocrine/Metabolic History: Reports: Diabetes, Type II


Hematologic History: Reports: None


Immunologic History: Reports: None


Oncologic (Cancer) History: Reports: Prostate


Dermatologic History: Reports: None





- Infectious Disease History


Infectious Disease History: Reports: Chicken Pox, Measles, Mumps





- Past Surgical History


Head Surgeries/Procedures: Reports: None


HEENT Surgical History: Reports: None, Cataract Surgery


Cardiovascular Surgical History: Reports: None


GI Surgical History: Reports: Colonoscopy


Other Male  Surgeries/Procedures: prostate problems


Musculoskeletal Surgical History: Reports: Other (See Below)


Other Musculoskeletal Surgeries/Procedures:: back surg, R) hip joint replacement





Social & Family History





- Family History


Family Medical History: Noncontributory


Other Hematologic Family History: mother  of cerebral aneurysm





- Caffeine Use


Caffeine Use: Reports: Coffee





- Living Situation & Occupation


Living situation: Reports: Single, Assisted Living


Occupation: Retired





ED ROS GENERAL





- Review of Systems


Review Of Systems: ROS reveals no pertinent complaints other than HPI.





ED EXAM, GENERAL





- Physical Exam


Exam: See Below


Exam Limited By: No Limitations


General Appearance: Alert, WD/WN, Mild Distress


Eye Exam: Bilateral Eye: EOMI, Normal Inspection, PERRL


Ears: Normal External Exam, Normal Canal, Hearing Grossly Normal, Normal TMs


Nose: Normal Inspection, Normal Mucosa, No Blood


Throat/Mouth: Normal Inspection, Normal Lips, Normal Teeth, Normal Gums, Normal 

Oropharynx, Normal Voice, No Airway Compromise


Head: Atraumatic, Normocephalic


Neck: Normal Inspection, Supple, Non-Tender, Full Range of Motion


Respiratory/Chest: No Respiratory Distress, No Accessory Muscle Use, Chest Non-

Tender, Wheezing (with expiration).  No: Crackles, Rales, Rhonchi


Cardiovascular: Normal Peripheral Pulses, Regular Rate, Rhythm, No Edema, No 

Gallop, No JVD, No Murmur, No Rub


GI/Abdominal: Normal Bowel Sounds, Soft, Non-Tender, No Organomegaly, No 

Distention, No Abnormal Bruit, No Mass


 (Male) Exam: Deferred


Rectal (Males) Exam: Deferred


Back Exam: Normal Inspection, Full Range of Motion, NT


Extremities: Normal Inspection, Normal Range of Motion, Non-Tender, Normal 

Capillary Refill, No Pedal Edema


Neurological: Alert, Oriented, CN II-XII Intact, Normal Cognition, Normal Gait, 

Normal Reflexes, No Motor/Sensory Deficits


Psychiatric: Normal Affect, Normal Mood


Skin Exam: Warm, Dry, Intact, Normal Color, No Rash


Lymphatic: No Adenopathy





Course





- Vital Signs


Last Recorded V/S: 


 Last Vital Signs











Temp  36.7 C   19 21:12


 


Pulse  75   19 21:12


 


Resp  20   19 21:12


 


BP  144/85 H  19 21:12


 


Pulse Ox  97   19 21:12














- Orders/Labs/Meds


Labs: 


 Laboratory Tests











  19 Range/Units





  21:21 21:21 


 


WBC  7.0   (5.0-10.0)  10^3/uL


 


RBC  3.87 L   (4.6-6.2)  10^6/uL


 


Hgb  9.6 L   (14.0-18.0)  g/dL


 


Hct  31.1 L   (40.0-54.0)  %


 


MCV  80.4   ()  fL


 


MCH  24.8 L   (27.0-34.0)  pg


 


MCHC  30.9 L   (33.0-35.0)  g/dL


 


Plt Count  250   (150-450)  10^3/uL


 


Neut % (Auto)  54.3   (42.2-75.2)  %


 


Lymph % (Auto)  30.0   (20.5-50.1)  %


 


Mono % (Auto)  9.5 H   (2-8)  %


 


Eos % (Auto)  5.8 H   (1.0-3.0)  %


 


Baso % (Auto)  0.4   (0.0-1.0)  %


 


Sodium   139  (135-145)  mmol/L


 


Potassium   4.0  (3.6-5.0)  mmol/L


 


Chloride   101  (101-111)  mmol/L


 


Carbon Dioxide   27.0  (21.0-31.0)  mmol/L


 


Anion Gap   15.0  


 


BUN   22 H  (7-18)  mg/dL


 


Creatinine   0.9  (0.6-1.3)  mg/dL


 


Est Cr Clr Drug Dosing   55.05  mL/min


 


Estimated GFR (MDRD)   > 60  


 


BUN/Creatinine Ratio   24.44  


 


Glucose   131 H  ()  mg/dL


 


Calcium   8.9  (8.4-10.2)  mg/dl


 


Total Bilirubin   0.3  (0.2-1.0)  mg/dL


 


AST   22  (10-42)  IU/L


 


ALT   14  (10-60)  IU/L


 


Alkaline Phosphatase   68  ()  IU/L


 


Total Protein   6.8  (6.7-8.2)  g/dl


 


Albumin   3.3  (3.2-5.5)  g/dl


 


Globulin   3.5  


 


Albumin/Globulin Ratio   0.94  














Departure





- Departure


Time of Disposition: 22:20


Disposition: Home, Self-Care 01


Condition: Fair


Clinical Impression: 


 Viral upper respiratory infection








- Discharge Information


*PRESCRIPTION DRUG MONITORING PROGRAM REVIEWED*: Not Applicable


*COPY OF PRESCRIPTION DRUG MONITORING REPORT IN PATIENT JORGE: Not Applicable


Instructions:  Viral Respiratory Infection, Easy-To-Read, Cough, Adult, Easy-to-

Read


Forms:  ED Department Discharge


Care Plan Goals: 


The patient and his daughter were advised of the examination, lab and x-ray 

results during the visit. The patient was encouraged to follow-up with his 

primary care facility for continued evaluation and further management. If the 

patient has any additional symptoms or concerns, the patient should either 

visit his primary care facility or return to the emergency department.

## 2019-02-08 ENCOUNTER — HOSPITAL ENCOUNTER (INPATIENT)
Dept: HOSPITAL 43 - DL.MS | Age: 84
LOS: 10 days | Discharge: HOME | DRG: 638 | End: 2019-02-18
Attending: INTERNAL MEDICINE | Admitting: INTERNAL MEDICINE
Payer: MEDICARE

## 2019-02-08 DIAGNOSIS — R45.1: ICD-10-CM

## 2019-02-08 DIAGNOSIS — Z79.899: ICD-10-CM

## 2019-02-08 DIAGNOSIS — F32.9: ICD-10-CM

## 2019-02-08 DIAGNOSIS — Z85.46: ICD-10-CM

## 2019-02-08 DIAGNOSIS — Z88.8: ICD-10-CM

## 2019-02-08 DIAGNOSIS — I10: ICD-10-CM

## 2019-02-08 DIAGNOSIS — K21.9: ICD-10-CM

## 2019-02-08 DIAGNOSIS — N40.1: ICD-10-CM

## 2019-02-08 DIAGNOSIS — G89.29: ICD-10-CM

## 2019-02-08 DIAGNOSIS — Z98.49: ICD-10-CM

## 2019-02-08 DIAGNOSIS — R32: ICD-10-CM

## 2019-02-08 DIAGNOSIS — K59.09: ICD-10-CM

## 2019-02-08 DIAGNOSIS — Z88.5: ICD-10-CM

## 2019-02-08 DIAGNOSIS — M54.9: ICD-10-CM

## 2019-02-08 DIAGNOSIS — M86.68: ICD-10-CM

## 2019-02-08 DIAGNOSIS — N12: ICD-10-CM

## 2019-02-08 DIAGNOSIS — D63.8: ICD-10-CM

## 2019-02-08 DIAGNOSIS — E78.00: ICD-10-CM

## 2019-02-08 DIAGNOSIS — Z87.891: ICD-10-CM

## 2019-02-08 DIAGNOSIS — B95.4: ICD-10-CM

## 2019-02-08 DIAGNOSIS — N40.0: ICD-10-CM

## 2019-02-08 DIAGNOSIS — D50.9: ICD-10-CM

## 2019-02-08 DIAGNOSIS — T50.8X5A: ICD-10-CM

## 2019-02-08 DIAGNOSIS — H54.7: ICD-10-CM

## 2019-02-08 DIAGNOSIS — R33.8: ICD-10-CM

## 2019-02-08 DIAGNOSIS — G30.9: ICD-10-CM

## 2019-02-08 DIAGNOSIS — N17.9: ICD-10-CM

## 2019-02-08 DIAGNOSIS — Z96.641: ICD-10-CM

## 2019-02-08 DIAGNOSIS — F41.9: ICD-10-CM

## 2019-02-08 DIAGNOSIS — B96.89: ICD-10-CM

## 2019-02-08 DIAGNOSIS — F02.80: ICD-10-CM

## 2019-02-08 DIAGNOSIS — L02.818: ICD-10-CM

## 2019-02-08 DIAGNOSIS — Z92.21: ICD-10-CM

## 2019-02-08 DIAGNOSIS — E11.21: ICD-10-CM

## 2019-02-08 DIAGNOSIS — E11.69: Primary | ICD-10-CM

## 2019-02-08 DIAGNOSIS — Z79.4: ICD-10-CM

## 2019-02-08 DIAGNOSIS — Z87.440: ICD-10-CM

## 2019-02-08 LAB
ANION GAP SERPL CALC-SCNC: 15.5 MMOL/L
CHLORIDE SERPL-SCNC: 97 MMOL/L (ref 101–111)
SODIUM SERPL-SCNC: 140 MMOL/L (ref 135–145)

## 2019-02-08 RX ADMIN — HEPARIN SODIUM SCH UNITS: 5000 INJECTION, SOLUTION INTRAVENOUS; SUBCUTANEOUS at 21:27

## 2019-02-08 RX ADMIN — Medication SCH EACH: at 16:47

## 2019-02-08 RX ADMIN — CHOLECALCIFEROL TAB 10 MCG (400 UNIT) SCH UNITS: 10 TAB at 21:23

## 2019-02-08 RX ADMIN — INSULIN GLARGINE SCH UNITS: 100 INJECTION, SOLUTION SUBCUTANEOUS at 21:29

## 2019-02-08 RX ADMIN — CARBOXYMETHYLCELLULOSE SODIUM SCH: 10 GEL OPHTHALMIC at 17:11

## 2019-02-08 RX ADMIN — CARBOXYMETHYLCELLULOSE SODIUM SCH EACH: 10 GEL OPHTHALMIC at 21:27

## 2019-02-08 RX ADMIN — POLYVINYL ALCOHOL SCH: 14 SOLUTION/ DROPS OPHTHALMIC at 17:09

## 2019-02-08 RX ADMIN — POLYVINYL ALCOHOL SCH DROP: 14 SOLUTION/ DROPS OPHTHALMIC at 21:21

## 2019-02-08 RX ADMIN — Medication SCH EACH: at 21:20

## 2019-02-08 RX ADMIN — Medication SCH EACH: at 21:32

## 2019-02-08 RX ADMIN — POLYVINYL ALCOHOL SCH: 14 SOLUTION/ DROPS OPHTHALMIC at 21:39

## 2019-02-08 RX ADMIN — Medication PRN ML: at 22:11

## 2019-02-08 RX ADMIN — CARBOXYMETHYLCELLULOSE SODIUM SCH: 10 GEL OPHTHALMIC at 21:39

## 2019-02-09 RX ADMIN — INSULIN GLARGINE SCH UNITS: 100 INJECTION, SOLUTION SUBCUTANEOUS at 21:09

## 2019-02-09 RX ADMIN — Medication SCH EACH: at 14:36

## 2019-02-09 RX ADMIN — Medication PRN ML: at 05:53

## 2019-02-09 RX ADMIN — Medication PRN ML: at 14:29

## 2019-02-09 RX ADMIN — Medication SCH EACH: at 09:15

## 2019-02-09 RX ADMIN — CHOLECALCIFEROL TAB 10 MCG (400 UNIT) SCH UNITS: 10 TAB at 09:07

## 2019-02-09 RX ADMIN — Medication PRN ML: at 20:43

## 2019-02-09 RX ADMIN — HEPARIN SODIUM SCH UNITS: 5000 INJECTION, SOLUTION INTRAVENOUS; SUBCUTANEOUS at 09:14

## 2019-02-09 RX ADMIN — POLYVINYL ALCOHOL SCH: 14 SOLUTION/ DROPS OPHTHALMIC at 09:13

## 2019-02-09 RX ADMIN — Medication SCH EACH: at 09:16

## 2019-02-09 RX ADMIN — CARBOXYMETHYLCELLULOSE SODIUM SCH: 10 GEL OPHTHALMIC at 09:13

## 2019-02-09 RX ADMIN — Medication SCH EACH: at 09:17

## 2019-02-09 RX ADMIN — HEPARIN SODIUM SCH UNITS: 5000 INJECTION, SOLUTION INTRAVENOUS; SUBCUTANEOUS at 21:01

## 2019-02-09 RX ADMIN — Medication SCH EACH: at 20:50

## 2019-02-09 RX ADMIN — CHOLECALCIFEROL TAB 10 MCG (400 UNIT) SCH UNITS: 10 TAB at 20:54

## 2019-02-09 RX ADMIN — POLYVINYL ALCOHOL SCH: 14 SOLUTION/ DROPS OPHTHALMIC at 14:27

## 2019-02-09 RX ADMIN — CARBOXYMETHYLCELLULOSE SODIUM SCH: 10 GEL OPHTHALMIC at 21:01

## 2019-02-09 RX ADMIN — CARBOXYMETHYLCELLULOSE SODIUM SCH: 10 GEL OPHTHALMIC at 14:27

## 2019-02-09 RX ADMIN — Medication SCH EACH: at 20:49

## 2019-02-09 RX ADMIN — Medication PRN ML: at 22:36

## 2019-02-09 RX ADMIN — POLYVINYL ALCOHOL SCH: 14 SOLUTION/ DROPS OPHTHALMIC at 21:01

## 2019-02-09 RX ADMIN — Medication SCH EACH: at 14:37

## 2019-02-09 RX ADMIN — Medication PRN ML: at 21:58

## 2019-02-10 RX ADMIN — Medication PRN ML: at 21:11

## 2019-02-10 RX ADMIN — CARBOXYMETHYLCELLULOSE SODIUM SCH: 10 GEL OPHTHALMIC at 14:46

## 2019-02-10 RX ADMIN — Medication PRN ML: at 06:01

## 2019-02-10 RX ADMIN — HEPARIN SODIUM SCH UNITS: 5000 INJECTION, SOLUTION INTRAVENOUS; SUBCUTANEOUS at 08:38

## 2019-02-10 RX ADMIN — CHOLECALCIFEROL TAB 10 MCG (400 UNIT) SCH UNITS: 10 TAB at 22:56

## 2019-02-10 RX ADMIN — Medication SCH EACH: at 22:55

## 2019-02-10 RX ADMIN — HEPARIN SODIUM SCH UNITS: 5000 INJECTION, SOLUTION INTRAVENOUS; SUBCUTANEOUS at 23:03

## 2019-02-10 RX ADMIN — Medication SCH EACH: at 08:47

## 2019-02-10 RX ADMIN — Medication PRN ML: at 22:51

## 2019-02-10 RX ADMIN — Medication SCH EACH: at 14:45

## 2019-02-10 RX ADMIN — Medication PRN ML: at 14:47

## 2019-02-10 RX ADMIN — INSULIN GLARGINE SCH UNITS: 100 INJECTION, SOLUTION SUBCUTANEOUS at 23:01

## 2019-02-10 RX ADMIN — CARBOXYMETHYLCELLULOSE SODIUM SCH: 10 GEL OPHTHALMIC at 23:01

## 2019-02-10 RX ADMIN — Medication PRN ML: at 06:37

## 2019-02-10 RX ADMIN — POLYVINYL ALCOHOL SCH: 14 SOLUTION/ DROPS OPHTHALMIC at 08:47

## 2019-02-10 RX ADMIN — Medication SCH EACH: at 14:46

## 2019-02-10 RX ADMIN — POLYVINYL ALCOHOL SCH: 14 SOLUTION/ DROPS OPHTHALMIC at 14:45

## 2019-02-10 RX ADMIN — Medication SCH EACH: at 08:48

## 2019-02-10 RX ADMIN — POLYVINYL ALCOHOL SCH: 14 SOLUTION/ DROPS OPHTHALMIC at 23:01

## 2019-02-10 RX ADMIN — CHOLECALCIFEROL TAB 10 MCG (400 UNIT) SCH UNITS: 10 TAB at 08:40

## 2019-02-10 RX ADMIN — CARBOXYMETHYLCELLULOSE SODIUM SCH: 10 GEL OPHTHALMIC at 08:47

## 2019-02-10 RX ADMIN — Medication SCH EACH: at 08:50

## 2019-02-10 RX ADMIN — Medication SCH EACH: at 08:49

## 2019-02-11 RX ADMIN — CHOLECALCIFEROL TAB 10 MCG (400 UNIT) SCH: 10 TAB at 10:06

## 2019-02-11 RX ADMIN — Medication SCH: at 17:42

## 2019-02-11 RX ADMIN — Medication SCH: at 10:05

## 2019-02-11 RX ADMIN — POLYVINYL ALCOHOL SCH: 14 SOLUTION/ DROPS OPHTHALMIC at 13:17

## 2019-02-11 RX ADMIN — HEPARIN SODIUM SCH: 5000 INJECTION, SOLUTION INTRAVENOUS; SUBCUTANEOUS at 10:05

## 2019-02-11 RX ADMIN — Medication SCH EACH: at 21:06

## 2019-02-11 RX ADMIN — CARBOXYMETHYLCELLULOSE SODIUM SCH: 10 GEL OPHTHALMIC at 21:17

## 2019-02-11 RX ADMIN — CARBOXYMETHYLCELLULOSE SODIUM SCH: 10 GEL OPHTHALMIC at 13:17

## 2019-02-11 RX ADMIN — POLYVINYL ALCOHOL SCH: 14 SOLUTION/ DROPS OPHTHALMIC at 10:05

## 2019-02-11 RX ADMIN — HEPARIN SODIUM SCH UNITS: 5000 INJECTION, SOLUTION INTRAVENOUS; SUBCUTANEOUS at 21:13

## 2019-02-11 RX ADMIN — INSULIN GLARGINE SCH: 100 INJECTION, SOLUTION SUBCUTANEOUS at 21:51

## 2019-02-11 RX ADMIN — POLYVINYL ALCOHOL SCH: 14 SOLUTION/ DROPS OPHTHALMIC at 21:17

## 2019-02-11 RX ADMIN — Medication SCH: at 10:06

## 2019-02-11 RX ADMIN — ONDANSETRON PRN MG: 4 TABLET, ORALLY DISINTEGRATING ORAL at 08:54

## 2019-02-11 RX ADMIN — CARBOXYMETHYLCELLULOSE SODIUM SCH: 10 GEL OPHTHALMIC at 10:06

## 2019-02-11 RX ADMIN — Medication PRN ML: at 20:43

## 2019-02-11 RX ADMIN — CHOLECALCIFEROL TAB 10 MCG (400 UNIT) SCH UNITS: 10 TAB at 21:08

## 2019-02-11 RX ADMIN — Medication PRN ML: at 14:26

## 2019-02-11 RX ADMIN — Medication SCH: at 13:17

## 2019-02-11 RX ADMIN — Medication PRN ML: at 05:51

## 2019-02-11 RX ADMIN — Medication PRN ML: at 21:59

## 2019-02-11 RX ADMIN — Medication PRN ML: at 22:38

## 2019-02-11 RX ADMIN — Medication PRN ML: at 06:26

## 2019-02-12 LAB
ANION GAP SERPL CALC-SCNC: 13.6 MMOL/L
CHLORIDE SERPL-SCNC: 100 MMOL/L (ref 101–111)
SODIUM SERPL-SCNC: 139 MMOL/L (ref 135–145)

## 2019-02-12 RX ADMIN — CHOLECALCIFEROL TAB 10 MCG (400 UNIT) SCH: 10 TAB at 12:56

## 2019-02-12 RX ADMIN — HEPARIN SODIUM SCH UNITS: 5000 INJECTION, SOLUTION INTRAVENOUS; SUBCUTANEOUS at 12:47

## 2019-02-12 RX ADMIN — Medication SCH: at 08:49

## 2019-02-12 RX ADMIN — POLYVINYL ALCOHOL SCH DROP: 14 SOLUTION/ DROPS OPHTHALMIC at 21:00

## 2019-02-12 RX ADMIN — CHOLECALCIFEROL TAB 10 MCG (400 UNIT) SCH: 10 TAB at 08:49

## 2019-02-12 RX ADMIN — POLYVINYL ALCOHOL SCH: 14 SOLUTION/ DROPS OPHTHALMIC at 13:13

## 2019-02-12 RX ADMIN — Medication SCH EACH: at 21:04

## 2019-02-12 RX ADMIN — HEPARIN SODIUM SCH UNITS: 5000 INJECTION, SOLUTION INTRAVENOUS; SUBCUTANEOUS at 20:53

## 2019-02-12 RX ADMIN — CARBOXYMETHYLCELLULOSE SODIUM SCH: 10 GEL OPHTHALMIC at 22:16

## 2019-02-12 RX ADMIN — Medication SCH EACH: at 13:37

## 2019-02-12 RX ADMIN — POLYVINYL ALCOHOL SCH: 14 SOLUTION/ DROPS OPHTHALMIC at 12:55

## 2019-02-12 RX ADMIN — CHOLECALCIFEROL TAB 10 MCG (400 UNIT) SCH UNITS: 10 TAB at 21:05

## 2019-02-12 RX ADMIN — CARBOXYMETHYLCELLULOSE SODIUM SCH: 10 GEL OPHTHALMIC at 13:14

## 2019-02-12 RX ADMIN — HEPARIN SODIUM SCH: 5000 INJECTION, SOLUTION INTRAVENOUS; SUBCUTANEOUS at 08:48

## 2019-02-12 RX ADMIN — CARBOXYMETHYLCELLULOSE SODIUM SCH: 10 GEL OPHTHALMIC at 12:56

## 2019-02-12 RX ADMIN — POLYVINYL ALCOHOL SCH: 14 SOLUTION/ DROPS OPHTHALMIC at 08:48

## 2019-02-12 RX ADMIN — INSULIN GLARGINE SCH UNITS: 100 INJECTION, SOLUTION SUBCUTANEOUS at 20:58

## 2019-02-12 RX ADMIN — Medication PRN ML: at 06:22

## 2019-02-12 RX ADMIN — CARBOXYMETHYLCELLULOSE SODIUM SCH: 10 GEL OPHTHALMIC at 08:49

## 2019-02-12 RX ADMIN — Medication PRN ML: at 07:03

## 2019-02-12 RX ADMIN — Medication SCH: at 12:56

## 2019-02-12 NOTE — PCM.PN
- General Info


Date of Service: 02/12/19


Admission Dx/Problem (Free Text): 


 Admission Diagnosis/Problem





Admission Diagnosis/Problem      Abscess








Subjective Update: 





Lei Norman is a 84 y/o M with PMH of chronic pain, prostate radiation, 

urethral stricture s/p dilatation, frequent UTIs, chronic incontinence of urine

, dementia. 


He recently finished prolonged course of abx with ceftriaxone and micafungin 

for pelvic abscess and pubic symphysis osteomyelitis, His blood cx had grown 

strep viridans and abscess grew candida glabrata while on abx. For 2 weeks 

prior to that admissionhe started having right lower abdominal pain and low 

grade fever. CT scan of the abdomen and pelvis on 2/1/19 showed evidence of 

abscess formation in the pectineus muscle and also osteomyelitis of the pelvis. 

[facility]. [facility] Image guided 

aspiration of a RIGHT upper thigh fluid collection on 2/4, cxs positive for 

strep species. He was managed for recurrent persistent pelvic abscess and

chronicosteomyelitis of the pubic symphysis. ID was consulted and recommended 

zosyn andMicafungin to complete 6 weeks course. Patient was sent to [facility] to complete antibiotics and also to continue PT/OT. 





Patient was seen and examined today. No acute events overnight. No chest pain, 

shortness of breath, fever, chills, nausea, vomiting.








Functional Status: Reports: Pain Controlled





- Review of Systems


General: Reports: No Symptoms


HEENT: Reports: No Symptoms


Pulmonary: Reports: No Symptoms


Cardiovascular: Reports: No Symptoms


Gastrointestinal: Reports: No Symptoms


Genitourinary: Reports: No Symptoms


Musculoskeletal: Reports: No Symptoms


Skin: Reports: No Symptoms


Neurological: Reports: No Symptoms


Psychiatric: Reports: No Symptoms





- Patient Data


Vitals - Most Recent: 


 Last Vital Signs











Temp  98.0 F   02/12/19 08:00


 


Pulse  65   02/12/19 08:00


 


Resp  20   02/12/19 08:00


 


BP  147/58 H  02/12/19 08:00


 


Pulse Ox  90 L  02/12/19 08:00











Weight - Most Recent: 192 lb 6.4 oz


I&O - Last 24 Hours: 


 Intake & Output











 02/11/19 02/12/19 02/12/19





 22:59 06:59 14:59


 


Intake Total 650  100


 


Balance 650  100











Lab Results Last 24 Hours: 


 Laboratory Results - last 24 hr











  02/11/19 02/11/19 02/11/19 Range/Units





  11:59 17:24 20:53 


 


POC Glucose  125 H  145 H  61 L  ()  mg/dl














  02/11/19 02/12/19 02/12/19 Range/Units





  21:40 01:31 07:33 


 


POC Glucose  139 H  130 H  117 H  ()  mg/dl











Med Orders - Current: 


 Current Medications





Acetaminophen (Tylenol)  650 mg PO Q4H PRN


   PRN Reason: Pain (mild 1-3)


Amlodipine Besylate (Norvasc)  5 mg PO DAILY [facility]


   Last Admin: 02/12/19 08:49 Dose:  Not Given


Artificial Tears (Refresh Celluvisc)  1 each EYEBOTH TID [facility]


   Last Admin: 02/12/19 08:49 Dose:  Not Given


Artificial Tears (Liquitears 1.4% Ophth Soln)  0 ml EYEBOTH TID [facility]


   Last Admin: 02/12/19 08:48 Dose:  Not Given


Ascorbic Acid (Vitamin C)  500 mg PO BID [facility]


   Last Admin: 02/12/19 08:49 Dose:  Not Given


Celecoxib (Celebrex)  100 mg PO DAILY [facility]


   Last Admin: 02/12/19 08:48 Dose:  Not Given


Cholecalciferol (Vitamin D3)  1,000 units PO BID [facility]


   Last Admin: 02/12/19 08:49 Dose:  Not Given


Dexamethasone (Dexamethasone)  0.5 mg PO DAILY [facility]


   Last Admin: 02/12/19 08:48 Dose:  Not Given


Donepezil HCl (Aricept)  10 mg PO DAILY [facility]


   Last Admin: 02/12/19 08:48 Dose:  Not Given


Duloxetine HCl (Cymbalta)  90 mg PO DAILY [facility]


   Last Admin: 02/12/19 08:48 Dose:  Not Given


Gabapentin (Neurontin)  100 mg PO TID [facility]


   Last Admin: 02/12/19 08:49 Dose:  Not Given


Guaifenesin (Mucinex)  600 mg PO BID [facility]


   Last Admin: 02/12/19 08:49 Dose:  Not Given


Heparin Sodium (Porcine) (Heparin Sodium)  5,000 units SUBCUT Q12H [facility]


   Last Admin: 02/12/19 08:48 Dose:  Not Given


Hydralazine HCl (Apresoline)  50 mg PO Q12HR [facility]


   Last Admin: 02/12/19 08:48 Dose:  Not Given


Micafungin Sodium 100 mg/ (Sodium Chloride)  100 mls @ 100 mls/hr IV Q24H [facility]


   Last Infusion: 02/11/19 21:59 Dose:  Infused


Piperacillin Sod/Tazobactam (Sod 3.375 gm/ Sodium Chloride)  100 mls @ 200 mls/

hr IV Q8HR [facility]


   Last Infusion: 02/12/19 07:04 Dose:  Infused


Insulin Glargine (Lantus)  28 unit SUBCUT BEDTIME [facility]


   Last Admin: 02/11/19 21:51 Dose:  Not Given


Insulin Human Lispro (Humalog)  5 unit SUBCUT WITHLUNCH [facility]


   Last Admin: 02/11/19 12:20 Dose:  Not Given


Insulin Human Lispro (Humalog)  8 unit SUBCUT ACDINNER [facility]


   Last Admin: 02/11/19 17:54 Dose:  8 units


Magnesium Hydroxide (Milk Of Magnesia)  15 ml PO DAILY PRN


   PRN Reason: Constipation


Melatonin (Melatonin)  6 mg PO BEDTIME [facility]


   Last Admin: 02/11/19 21:07 Dose:  6 mg


Metoprolol Tartrate (Lopressor)  25 mg PO BID [facility]


   Last Admin: 02/12/19 08:48 Dose:  Not Given


Alendronate Sodium [ Fosamax] 70mg  ** Own Med **  70 mg PO Q7D [facility]


   Last Admin: 02/11/19 05:56 Dose:  70 mg


Non-Formulary Medication (Cranberry [Cranberry])  500 mg PO BID [facility]


Ondansetron HCl (Zofran Odt)  8 mg PO Q8HR PRN


   PRN Reason: Nausea


   Last Admin: 02/11/19 08:54 Dose:  8 mg


Oxycodone HCl (Oxycodone)  10 mg PO Q4HR [facility]


   Last Admin: 02/12/19 09:04 Dose:  Not Given


Pantoprazole Sodium (Protonix***)  40 mg PO DAILY [facility]


   Last Admin: 02/12/19 08:49 Dose:  Not Given


Bicalutamide [ Casodex] 50 Mg Tab * *Own Med**  1 each PO DAILY [facility]


   Last Admin: 02/12/19 08:49 Dose:  Not Given


Buspirone [Buspar] 10 Mg Tab **Own Med* *  2 each PO TID [facility]


   Last Admin: 02/12/19 08:49 Dose:  Not Given


Culturelle Probiotic (1 Cap)  1 each PO DAILY [facility]


   Last Admin: 02/12/19 08:49 Dose:  Not Given


Polyethylene Glycol (Miralax)  17 gm PO BID [facility]


   Last Admin: 02/12/19 08:48 Dose:  Not Given


Risperidone (Risperidal)  0.25 mg PO BEDTIME [facility]


   Last Admin: 02/11/19 21:09 Dose:  0.25 mg


Senna/Docusate Sodium (Senna Plus)  2 tab PO BID [facility]


   Last Admin: 02/12/19 08:49 Dose:  Not Given


Sodium Chloride (Saline Flush)  10 ml FLUSH ASDIRECTED PRN


   PRN Reason: IV Use


   Last Admin: 02/12/19 07:03 Dose:  10 ml


Tamsulosin HCl (Flomax)  0.4 mg PO BEDTIME [facility]


   Last Admin: 02/11/19 21:02 Dose:  0.4 mg





Discontinued Medications





Piperacillin Sod/Tazobactam (Sod 3.375 gm/ Sodium Chloride)  100 mls @ 200 mls/

hr IV Q8H [facility]


   Last Admin: 02/08/19 17:16 Dose:  Not Given


Non-Formulary Medication (Methenamine Hippurate [Hiprex])  1 tab PO BID [facility]


   Last Admin: 02/11/19 17:42 Dose:  Not Given


Non-Formulary Medication (Nf Drug)   each PO TID [facility]


Cranberry Conc/Ascorbic Acid [ Cystex Cranberry] 2 Tab  1 each PO TID [facility]


   Last Admin: 02/09/19 14:37 Dose:  1 each


Cranberry Conc/Ascorbic Acid [ Cystex Cranberry] 2 Tab  2 each PO TID [facility]


   Last Admin: 02/11/19 10:06 Dose:  Not Given


Piperacillin Sod/Tazobactam Sod (Zosyn)  3.375 gm IV Q8H [facility]


   Last Admin: 02/08/19 17:15 Dose:  Not Given











- Exam


General: Alert, Oriented


HEENT: Pupils Equal, Pupils Reactive, EOMI, Mucous Membr. Moist/Pink


Neck: Supple


Lungs: Clear to Auscultation, Normal Respiratory Effort


Cardiovascular: Regular Rate, Regular Rhythm


GI/Abdominal Exam: Normal Bowel Sounds, Soft, Non-Tender, No Organomegaly, No 

Distention, No Abnormal Bruit, No Mass, Pelvis Stable


 (Male) Exam: No Hernia, Normal Inspection, Normal Prostate, Circumcised


Back Exam: Normal Inspection, Full Range of Motion


Extremities: Normal Inspection, Normal Range of Motion, Non-Tender, No Pedal 

Edema, Normal Capillary Refill


Skin: Warm, Dry, Intact


Wound/Incisions: Healing Well


Neurological: No New Focal Deficit


Psy/Mental Status: Alert, Normal Affect, Normal Mood





- Problem List & Annotations


(1) Osteomyelitis


SNOMED Code(s): 08889472


   Code(s): M86.9 - OSTEOMYELITIS, UNSPECIFIED   Status: Acute   Current Visit: 

Yes   





(2) Abscess


SNOMED Code(s): 667120800


   Code(s): L02.91 - CUTANEOUS ABSCESS, UNSPECIFIED   Status: Acute   Current 

Visit: Yes   





(3) Weakness generalized


SNOMED Code(s): 68489039


   Code(s): R53.1 - WEAKNESS   Status: Acute   Current Visit: Yes   





(4) Chronic pain


SNOMED Code(s): 06491396


   Code(s): G89.29 - OTHER CHRONIC PAIN   Status: Acute   Current Visit: No   


Qualifiers: 


   Chronic pain type: chronic pain syndrome   Qualified Code(s): G89.4 - 

Chronic pain syndrome   





(5) Diabetes


SNOMED Code(s): 10717900


   Code(s): E11.9 - TYPE 2 DIABETES MELLITUS WITHOUT COMPLICATIONS   Status: 

Acute   Current Visit: No   





(6) Left groin pain


SNOMED Code(s): 229306987


   Code(s): R10.32 - LEFT LOWER QUADRANT PAIN   Status: Acute   Current Visit: 

No   





- Problem List Review


Problem List Initiated/Reviewed/Updated: Yes





- My Orders


Last 24 Hours: 


My Active Orders





02/15/19 06:00


ALANINE AMINOTRANSFERASE,ALT [CHEM] Routine 


CBC WITH AUTO DIFF [HEME] Routine 


CREATININE W/GFR [CHEM] Routine 


CRP [C-REACTIVE PROTEIN] [CHEM] Routine 


ESR [SEDIMENTATION RATE MANUAL] [HEME] Routine 














- Plan


Plan:: 





#Recurrent persistent pelvic abscess andchronicosteomyelitis of the pubic 

symphysis


Continue micafungin and Zosyn


- To complete 6 weeks course





#Acute kidney injury. Etiology unclear


-likely secondary to contrast associated nephropathy as well as allergic 

interstitial nephritis secondary to antibiotics and infection all causing 

possible ischemic injury versus ATN


Nephrology evaluated at [facility]. creatinine has been stable





#Dementia with agitation


Continue Buspar, Cymbalta, and Risperdal





#Prostate cancer/chronic urinary retention


-Has h/o retention with intermittent self-catheterization at home.


Daugherty catheter placed


- Routine Daugherty care. 





#Microcytic anemia/anemia of chronic inflammation


stable


Monitor CBC





#Hypertension


- BP at goal


-continue home medications


Monitor blood pressures closely





#Diabetes: On insulin at home.


 Continue NovoLog and Lantus


Sliding scale insulin with hypoglycemia protocol


Accu-Cheks at mealtime and at bedtime.





#Diabetic diet





# Full code

## 2019-02-12 NOTE — CR
CLINICAL HISTORY: 83-year-old clinically "hypoxic" male.

 

INTERPRETATION: Upright AP portable chest confirms long-arm catheter on the

right with the tip in the region of the superior vena cava (mediastinum).

 

Normal cardiac silhouette without cephalization of vascular flow, signs of

alveolar edema or dependent pleural fluid accumulation (effusion).

 

No new lung mass, hilar lymphadenopathy or focal lobar consolidation when

compared to CT exam 9 January 2019. 

 

No pneumothorax or pneumomediastinum.

 

CONCLUSION: No acute new cardiopulmonary abnormality.

## 2019-02-13 RX ADMIN — Medication SCH EACH: at 09:20

## 2019-02-13 RX ADMIN — POLYVINYL ALCOHOL SCH: 14 SOLUTION/ DROPS OPHTHALMIC at 13:23

## 2019-02-13 RX ADMIN — HEPARIN SODIUM SCH UNITS: 5000 INJECTION, SOLUTION INTRAVENOUS; SUBCUTANEOUS at 09:21

## 2019-02-13 RX ADMIN — Medication SCH: at 15:19

## 2019-02-13 RX ADMIN — CARBOXYMETHYLCELLULOSE SODIUM SCH: 10 GEL OPHTHALMIC at 15:10

## 2019-02-13 RX ADMIN — Medication SCH EACH: at 09:21

## 2019-02-13 RX ADMIN — Medication SCH: at 09:29

## 2019-02-13 RX ADMIN — INSULIN GLARGINE SCH: 100 INJECTION, SOLUTION SUBCUTANEOUS at 23:21

## 2019-02-13 RX ADMIN — Medication SCH EACH: at 09:22

## 2019-02-13 RX ADMIN — ONDANSETRON PRN MG: 4 TABLET, ORALLY DISINTEGRATING ORAL at 12:30

## 2019-02-13 RX ADMIN — CHOLECALCIFEROL TAB 10 MCG (400 UNIT) SCH UNITS: 10 TAB at 09:17

## 2019-02-13 RX ADMIN — CARBOXYMETHYLCELLULOSE SODIUM SCH: 10 GEL OPHTHALMIC at 08:24

## 2019-02-13 RX ADMIN — Medication SCH MG: at 09:21

## 2019-02-13 RX ADMIN — Medication SCH: at 09:30

## 2019-02-13 RX ADMIN — Medication SCH: at 09:15

## 2019-02-13 RX ADMIN — POLYVINYL ALCOHOL SCH: 14 SOLUTION/ DROPS OPHTHALMIC at 08:23

## 2019-02-13 RX ADMIN — Medication SCH MG: at 09:20

## 2019-02-14 RX ADMIN — Medication SCH: at 11:05

## 2019-02-14 RX ADMIN — Medication PRN ML: at 20:44

## 2019-02-14 RX ADMIN — POLYVINYL ALCOHOL SCH: 14 SOLUTION/ DROPS OPHTHALMIC at 13:33

## 2019-02-14 RX ADMIN — CARBOXYMETHYLCELLULOSE SODIUM SCH: 10 GEL OPHTHALMIC at 11:05

## 2019-02-14 RX ADMIN — CARBOXYMETHYLCELLULOSE SODIUM SCH: 10 GEL OPHTHALMIC at 05:02

## 2019-02-14 RX ADMIN — Medication PRN ML: at 22:51

## 2019-02-14 RX ADMIN — POLYVINYL ALCOHOL SCH: 14 SOLUTION/ DROPS OPHTHALMIC at 11:04

## 2019-02-14 RX ADMIN — Medication PRN ML: at 00:30

## 2019-02-14 RX ADMIN — Medication SCH: at 13:33

## 2019-02-14 RX ADMIN — CHOLECALCIFEROL TAB 10 MCG (400 UNIT) SCH: 10 TAB at 05:01

## 2019-02-14 RX ADMIN — POLYVINYL ALCOHOL SCH: 14 SOLUTION/ DROPS OPHTHALMIC at 05:03

## 2019-02-14 RX ADMIN — Medication SCH: at 05:02

## 2019-02-14 RX ADMIN — ONDANSETRON PRN MG: 4 TABLET, ORALLY DISINTEGRATING ORAL at 09:13

## 2019-02-14 RX ADMIN — Medication SCH: at 05:03

## 2019-02-14 RX ADMIN — Medication PRN ML: at 22:20

## 2019-02-14 RX ADMIN — CARBOXYMETHYLCELLULOSE SODIUM SCH: 10 GEL OPHTHALMIC at 13:33

## 2019-02-14 RX ADMIN — INSULIN GLARGINE SCH UNITS: 100 INJECTION, SOLUTION SUBCUTANEOUS at 21:02

## 2019-02-14 RX ADMIN — Medication SCH: at 11:04

## 2019-02-14 RX ADMIN — CHOLECALCIFEROL TAB 10 MCG (400 UNIT) SCH: 10 TAB at 11:05

## 2019-02-15 LAB
ANION GAP SERPL CALC-SCNC: 13.2 MMOL/L
CHLORIDE SERPL-SCNC: 101 MMOL/L (ref 101–111)
SODIUM SERPL-SCNC: 139 MMOL/L (ref 135–145)

## 2019-02-15 RX ADMIN — Medication PRN ML: at 14:30

## 2019-02-15 RX ADMIN — INSULIN GLARGINE SCH UNITS: 100 INJECTION, SOLUTION SUBCUTANEOUS at 22:08

## 2019-02-15 RX ADMIN — Medication PRN ML: at 06:41

## 2019-02-15 RX ADMIN — Medication PRN ML: at 06:04

## 2019-02-16 RX ADMIN — INSULIN GLARGINE SCH: 100 INJECTION, SOLUTION SUBCUTANEOUS at 22:13

## 2019-02-17 RX ADMIN — INSULIN GLARGINE SCH: 100 INJECTION, SOLUTION SUBCUTANEOUS at 20:32

## 2019-02-17 RX ADMIN — Medication PRN ML: at 14:13

## 2019-02-18 VITALS — DIASTOLIC BLOOD PRESSURE: 80 MMHG | SYSTOLIC BLOOD PRESSURE: 165 MMHG

## 2019-02-18 NOTE — [id number]
**Discharge Summary





- Hospital Course


Free Text/Narrative:: 





Lei Norman is a 84 y/o M with PMH of chronic pain, prostate radiation, 

urethral stricture s/p dilatation, frequent UTIs, chronic incontinence of urine

, dementia. 


He recently finished prolonged course of abx with ceftriaxone and micafungin 

for pelvic abscess and pubic symphysis osteomyelitis, His blood cx had grown 

strep viridans and abscess grew candida glabrata while on abx. For 2 weeks 

prior to that admissionhe started having right lower abdominal pain and low 

grade fever. CT scan of the abdomen and pelvis on 2/1/19 showed evidence of 

abscess formation in the pectineus muscle and also osteomyelitis of the pelvis. 

Patient was admitted to for further management. He had Image guided 

aspiration of a RIGHT upper thigh fluid collection on 2/4, cxs positive for 

strep species. He was managed for recurrent persistent pelvic abscess and

chronicosteomyelitis of the pubic symphysis. ID was consulted and recommended 

zosyn andMicafungin to complete 6 weeks course. Patient was sent to [facility] to complete antibiotics and also to continue PT/OT. 


following the IV course will be discharged on oral fluconazole and augmentin


f.up [facility] ID on 2/26/19





Diagnosis: Stroke: No





- Discharge Data


Discharge Date: 02/18/19


Discharge Disposition: Home, Self-Care 01


Condition: Good





- Discharge Diagnosis/Problem(s)


(1) Abscess


SNOMED Code(s): 300099142


   ICD Code: L02.91 - CUTANEOUS ABSCESS, UNSPECIFIED   Status: Acute   Current 

Visit: Yes   





(2) Dementia


SNOMED Code(s): 51380701


   ICD Code: F03.90 - UNSPECIFIED DEMENTIA WITHOUT BEHAVIORAL DISTURBANCE   

Status: Acute   Current Visit: No   





- Patient Summary/Data


Consults: 


 Consultations





02/08/19 14:13


OT Evaluation and Treatment [CONS] Routine 


PT Evaluation and Treatment [CONS] Routine 














- Patient Instructions


Diet: Usual Diet as Tolerated


Activity: As Tolerated





- Discharge Plan


*PRESCRIPTION DRUG MONITORING PROGRAM REVIEWED*: Not Applicable


*COPY OF PRESCRIPTION DRUG MONITORING REPORT IN PATIENT JORGE: Not Applicable


Prescriptions/Med Rec: 


Amoxicillin/Clavulanate K [Augmentin 500-125 MG] 1 tab PO TID #21 tab


Fluconazole 100 mg PO DAILY #9 tablet


Home Medications: 


 Home Meds





Tamsulosin [Flomax] 0.4 mg PO BEDTIME 03/21/14 [History]


Acetaminophen [Tylenol] 650 mg PO Q6H PRN 04/27/18 [History]


Dexamethasone 0.5 mg PO DAILY 04/27/18 [History]


Gabapentin [Neurontin] 100 mg PO TID 04/27/18 [History]


Celecoxib [CeleBREX] 100 mg PO DAILY 11/08/18 [History]


DULoxetine [Cymbalta] 90 mg PO DAILY 11/08/18 [History]


Insulin Aspart [NovoLOG] 5 unit SQ WITHLUNCH 11/08/18 [History]


Insulin Aspart [NovoLOG] 8 unit SQ ACDINNER 11/08/18 [History]


Insulin Glarg,Human.Rec.Analog [Lantus] 28 units SQ BEDTIME 11/08/18 [History]


Magnesium Hydroxide [Milk of Magnesia] 15 ml PO DAILY PRN 11/08/18 [History]


Ondansetron [Zofran ODT] 8 mg PO Q8HR PRN 11/08/18 [History]


Pantoprazole Sodium [Protonix] 40 mg PO DAILY 11/08/18 [History]


Polyethylene Glycol 3350 [MiraLAX] 17 gm PO BID 11/08/18 [History]


Sennosides/Docusate Sodium [Senna Laxative Tablet] 2 each PO BID 11/08/18 [

History]


oxyCODONE HCl [Oxycodone HCl] 10 mg PO Q6H 11/08/18 [History]


risperiDONE [Risperdal] 0.25 mg PO BEDTIME 11/08/18 [History]


Metoprolol Tartrate 25 mg PO BID 02/08/19 [History]


amLODIPine [Norvasc] 5 mg PO DAILY 02/08/19 [History]


hydrALAZINE [Apresoline] 50 mg PO Q12HR 02/08/19 [History]


guaiFENesin [Mucinex] 600 mg PO BID 02/09/19 [History]


Amoxicillin/Clavulanate K [Augmentin 500-125 MG] 1 tab PO TID #21 tab 02/18/19 [

Rx]


Fluconazole 100 mg PO DAILY #9 tablet 02/18/19 [Rx]


diazePAM [Valium] 2 mg PO BEDTIME PRN  tablet 02/18/19 [Rx]











- Discharge Summary/Plan Comment


DC Time >30 min.: No





- General Info


Date of Service: 02/18/19





- Review of Systems


General: Denies: Fever, Weakness


Pulmonary: Denies: Shortness of Breath


Gastrointestinal: Denies: Abdominal Pain


Genitourinary: Denies: Dysuria


Neurological: Reports: Confusion





- Patient Data


Vitals - Most Recent: 


 Last Vital Signs











Temp  36.4 C   02/18/19 08:36


 


Pulse  67   02/18/19 08:36


 


Resp  20   02/18/19 08:36


 


BP  165/80 H  02/18/19 08:36


 


Pulse Ox  96   02/18/19 08:36











Weight - Most Recent: 90.446 kg


I&O - Last 24 hours: 


 Intake & Output











 02/17/19 02/18/19 02/18/19





 22:59 06:59 14:59


 


Intake Total 4560 150 


 


Balance 4560 150 











Lab Results - Last 24 hrs: 


 Laboratory Results - last 24 hr











  02/17/19 02/17/19 02/17/19 Range/Units





  11:27 17:07 20:11 


 


POC Glucose  138 H  116 H  132 H  ()  mg/dl














  02/18/19 Range/Units





  07:51 


 


POC Glucose  105  ()  mg/dl











Med Orders - Current: 


 Current Medications





Acetaminophen (Tylenol)  650 mg PO Q4H PRN


   PRN Reason: Pain (mild 1-3)


Amlodipine Besylate (Norvasc)  5 mg PO DAILY [facility]


   Last Admin: 02/17/19 08:41 Dose:  5 mg


Celecoxib (Celebrex)  100 mg PO DAILY [facility]


   Last Admin: 02/17/19 08:41 Dose:  100 mg


Dexamethasone (Dexamethasone)  0.5 mg PO DAILY [facility]


   Last Admin: 02/17/19 08:41 Dose:  0.5 mg


Diazepam (Valium)  2 mg PO BEDTIME PRN


   PRN Reason: Sleep


   Last Admin: 02/17/19 20:04 Dose:  2 mg


Duloxetine HCl (Cymbalta)  90 mg PO DAILY [facility]


   Last Admin: 02/17/19 08:40 Dose:  90 mg


Gabapentin (Neurontin)  100 mg PO TID [facility]


   Last Admin: 02/17/19 20:20 Dose:  100 mg


Guaifenesin (Mucinex)  600 mg PO BID [facility]


   Last Admin: 02/17/19 20:21 Dose:  600 mg


Hydralazine HCl (Apresoline)  50 mg PO Q12HR [facility]


   Last Admin: 02/17/19 20:23 Dose:  50 mg


Micafungin Sodium 100 mg/ (Sodium Chloride)  100 mls @ 100 mls/hr IV Q24H [facility]


   Last Admin: 02/17/19 22:33 Dose:  100 mls/hr


Piperacillin Sod/Tazobactam (Sod 3.375 gm/ Sodium Chloride)  100 mls @ 200 mls/

hr IV Q8HR [facility]


   Last Admin: 02/18/19 05:53 Dose:  200 mls/hr


Insulin Glargine (Lantus)  28 unit SUBCUT BEDTIME [facility]


   Last Admin: 02/17/19 20:32 Dose:  Not Given


Insulin Human Lispro (Humalog)  5 unit SUBCUT WITHLUNCH [facility]


   Last Admin: 02/17/19 12:32 Dose:  5 units


Insulin Human Lispro (Humalog)  8 unit SUBCUT ACDINNER [facility]


   Last Admin: 02/17/19 17:29 Dose:  Not Given


Magnesium Hydroxide (Milk Of Magnesia)  15 ml PO DAILY PRN


   PRN Reason: Constipation


Metoprolol Tartrate (Lopressor)  25 mg PO BID [facility]


   Last Admin: 02/17/19 20:23 Dose:  25 mg


Ondansetron HCl (Zofran Odt)  8 mg PO Q8HR PRN


   PRN Reason: Nausea


   Last Admin: 02/14/19 09:13 Dose:  8 mg


Oxycodone HCl (Oxycodone)  10 mg PO Q4HR [facility]


   Last Admin: 02/18/19 05:54 Dose:  10 mg


Pantoprazole Sodium (Protonix***)  40 mg PO DAILY [facility]


   Last Admin: 02/17/19 08:41 Dose:  40 mg


Polyethylene Glycol (Miralax)  17 gm PO BID [facility]


   Last Admin: 02/17/19 20:19 Dose:  17 gm


Risperidone (Risperidal)  0.25 mg PO BEDTIME [facility]


   Last Admin: 02/17/19 20:22 Dose:  0.25 mg


Senna/Docusate Sodium (Senna Plus)  2 tab PO BID [facility]


   Last Admin: 02/17/19 20:19 Dose:  2 tab


Sodium Chloride (Saline Flush)  10 ml FLUSH ASDIRECTED PRN


   PRN Reason: IV Use


   Last Admin: 02/17/19 14:13 Dose:  10 ml


Tamsulosin HCl (Flomax)  0.4 mg PO BEDTIME [facility]


   Last Admin: 02/17/19 20:22 Dose:  0.4 mg





Discontinued Medications





Artificial Tears (Refresh Celluvisc)  1 each EYEBOTH TID [facility]


   Last Admin: 02/14/19 13:33 Dose:  Not Given


Artificial Tears (Liquitears 1.4% Ophth Soln)  0 ml EYEBOTH TID [facility]


   Last Admin: 02/14/19 13:33 Dose:  Not Given


Ascorbic Acid (Vitamin C)  500 mg PO BID [facility]


   Last Admin: 02/14/19 11:05 Dose:  Not Given


Cholecalciferol (Vitamin D3)  1,000 units PO BID [facility]


   Last Admin: 02/14/19 11:05 Dose:  Not Given


Diazepam (Valium.)  2.5 mg PO ONETIME ONE


   Stop: 02/12/19 23:00


   Last Admin: 02/13/19 04:45 Dose:  2.5 mg


Diazepam (Valium.)  2.5 mg PO ONETIME ONE


   Stop: 02/12/19 23:02


   Last Admin: 02/13/19 05:02 Dose:  Not Given


Donepezil HCl (Aricept)  10 mg PO DAILY [facility]


   Last Admin: 02/14/19 11:04 Dose:  Not Given


Haloperidol Lactate (Haldol)  4 mg IM ONETIME ONE


   Stop: 02/17/19 21:56


   Last Admin: 02/17/19 22:02 Dose:  4 mg


Heparin Sodium (Porcine) (Heparin Sodium)  5,000 units SUBCUT Q12H [facility]


   Last Admin: 02/13/19 09:21 Dose:  5,000 units


Piperacillin Sod/Tazobactam (Sod 3.375 gm/ Sodium Chloride)  100 mls @ 200 mls/

hr IV Q8H [facility]


   Last Admin: 02/08/19 17:16 Dose:  Not Given


Insulin Glargine (Lantus)  14 unit SUBCUT ONETIME ONE


   Stop: 02/13/19 23:23


   Last Admin: 02/14/19 00:12 Dose:  14 units


Lorazepam (Ativan)  2 mg IM ONETIME ONE


   Stop: 02/17/19 23:08


   Last Admin: 02/17/19 23:16 Dose:  2 mg


Lorazepam (Ativan) Confirm Administered Dose 2 mg .ROUTE .STK-MED ONE


   Stop: 02/17/19 23:13


   Last Admin: 02/17/19 23:18 Dose:  Not Given


Melatonin (Melatonin)  6 mg PO BEDTIME [facility]


   Last Admin: 02/14/19 05:03 Dose:  Not Given


Alendronate Sodium [ Fosamax] 70mg  ** Own Med **  70 mg PO Q7D [facility]


   Last Admin: 02/11/19 05:56 Dose:  70 mg


Cranberry 500 Mg ** (Non-Formulary Med**)  0 mg PO BID [facility]


   Last Admin: 02/14/19 11:04 Dose:  Not Given


Non-Formulary Medication (Methenamine Hippurate [Hiprex])  1 tab PO BID [facility]


   Last Admin: 02/11/19 17:42 Dose:  Not Given


Non-Formulary Medication (Nf Drug)   each PO TID [facility]


Patient Own **Cystex (Tablet**)  1 each PO TID [facility]


   Last Admin: 02/14/19 13:33 Dose:  Not Given


Bicalutamide [ Casodex] 50 Mg Tab * *Own Med**  1 each PO DAILY [facility]


   Last Admin: 02/14/19 11:05 Dose:  Not Given


Buspirone [Buspar] 10 Mg Tab **Own Med* *  2 each PO TID [facility]


   Last Admin: 02/14/19 13:33 Dose:  Not Given


Cranberry Conc/Ascorbic Acid [ Cystex Cranberry] 2 Tab  1 each PO TID [facility]


   Last Admin: 02/09/19 14:37 Dose:  1 each


Culturelle Probiotic (1 Cap)  1 each PO DAILY [facility]


   Last Admin: 02/14/19 11:05 Dose:  Not Given


Cranberry Conc/Ascorbic Acid [ Cystex Cranberry] 2 Tab  2 each PO TID [facility]


   Last Admin: 02/11/19 10:06 Dose:  Not Given


Piperacillin Sod/Tazobactam Sod (Zosyn)  3.375 gm IV Q8H [facility]


   Last Admin: 02/08/19 17:15 Dose:  Not Given











- Exam


General: Reports: Alert, Oriented


Neck: Reports: Supple


Lungs: Reports: Clear to Auscultation, Normal Respiratory Effort


Cardiovascular: Reports: Regular Rate, Regular Rhythm


GI/Abdominal Exam: Normal Bowel Sounds, Soft, Non-Tender